# Patient Record
Sex: MALE | Race: OTHER | HISPANIC OR LATINO | URBAN - METROPOLITAN AREA
[De-identification: names, ages, dates, MRNs, and addresses within clinical notes are randomized per-mention and may not be internally consistent; named-entity substitution may affect disease eponyms.]

---

## 2018-07-07 ENCOUNTER — EMERGENCY (EMERGENCY)
Facility: HOSPITAL | Age: 68
LOS: 1 days | Discharge: HOME | End: 2018-07-07
Attending: EMERGENCY MEDICINE
Payer: COMMERCIAL

## 2018-07-07 VITALS
TEMPERATURE: 98 F | DIASTOLIC BLOOD PRESSURE: 85 MMHG | HEART RATE: 65 BPM | RESPIRATION RATE: 18 BRPM | SYSTOLIC BLOOD PRESSURE: 136 MMHG | OXYGEN SATURATION: 97 %

## 2018-07-07 VITALS — WEIGHT: 160.06 LBS | HEIGHT: 65 IN

## 2018-07-07 LAB
ALBUMIN SERPL ELPH-MCNC: 4.3 G/DL — SIGNIFICANT CHANGE UP (ref 3.3–5.2)
ALP SERPL-CCNC: 107 U/L — SIGNIFICANT CHANGE UP (ref 40–120)
ALT FLD-CCNC: 26 U/L — SIGNIFICANT CHANGE UP
ANION GAP SERPL CALC-SCNC: 14 MMOL/L — SIGNIFICANT CHANGE UP (ref 5–17)
APPEARANCE UR: CLEAR — SIGNIFICANT CHANGE UP
AST SERPL-CCNC: 24 U/L — SIGNIFICANT CHANGE UP
BACTERIA # UR AUTO: ABNORMAL
BASOPHILS # BLD AUTO: 0 K/UL — SIGNIFICANT CHANGE UP (ref 0–0.2)
BASOPHILS NFR BLD AUTO: 0.1 % — SIGNIFICANT CHANGE UP (ref 0–2)
BILIRUB SERPL-MCNC: 0.2 MG/DL — LOW (ref 0.4–2)
BILIRUB UR-MCNC: NEGATIVE — SIGNIFICANT CHANGE UP
BUN SERPL-MCNC: 10 MG/DL — SIGNIFICANT CHANGE UP (ref 8–20)
CALCIUM SERPL-MCNC: 9 MG/DL — SIGNIFICANT CHANGE UP (ref 8.6–10.2)
CHLORIDE SERPL-SCNC: 103 MMOL/L — SIGNIFICANT CHANGE UP (ref 98–107)
CO2 SERPL-SCNC: 21 MMOL/L — LOW (ref 22–29)
COLOR SPEC: YELLOW — SIGNIFICANT CHANGE UP
CREAT SERPL-MCNC: 0.64 MG/DL — SIGNIFICANT CHANGE UP (ref 0.5–1.3)
DIFF PNL FLD: NEGATIVE — SIGNIFICANT CHANGE UP
EOSINOPHIL # BLD AUTO: 0.2 K/UL — SIGNIFICANT CHANGE UP (ref 0–0.5)
EOSINOPHIL NFR BLD AUTO: 2.6 % — SIGNIFICANT CHANGE UP (ref 0–5)
EPI CELLS # UR: SIGNIFICANT CHANGE UP
GLUCOSE SERPL-MCNC: 110 MG/DL — SIGNIFICANT CHANGE UP (ref 70–115)
GLUCOSE UR QL: NEGATIVE MG/DL — SIGNIFICANT CHANGE UP
HCT VFR BLD CALC: 45.1 % — SIGNIFICANT CHANGE UP (ref 42–52)
HGB BLD-MCNC: 15.1 G/DL — SIGNIFICANT CHANGE UP (ref 14–18)
KETONES UR-MCNC: NEGATIVE — SIGNIFICANT CHANGE UP
LEUKOCYTE ESTERASE UR-ACNC: NEGATIVE — SIGNIFICANT CHANGE UP
LIDOCAIN IGE QN: 30 U/L — SIGNIFICANT CHANGE UP (ref 22–51)
LYMPHOCYTES # BLD AUTO: 0.9 K/UL — LOW (ref 1–4.8)
LYMPHOCYTES # BLD AUTO: 10.9 % — LOW (ref 20–55)
MCHC RBC-ENTMCNC: 25.2 PG — LOW (ref 27–31)
MCHC RBC-ENTMCNC: 33.5 G/DL — SIGNIFICANT CHANGE UP (ref 32–36)
MCV RBC AUTO: 75.2 FL — LOW (ref 80–94)
MONOCYTES # BLD AUTO: 0.6 K/UL — SIGNIFICANT CHANGE UP (ref 0–0.8)
MONOCYTES NFR BLD AUTO: 6.9 % — SIGNIFICANT CHANGE UP (ref 3–10)
NEUTROPHILS # BLD AUTO: 6.7 K/UL — SIGNIFICANT CHANGE UP (ref 1.8–8)
NEUTROPHILS NFR BLD AUTO: 79.3 % — HIGH (ref 37–73)
NITRITE UR-MCNC: NEGATIVE — SIGNIFICANT CHANGE UP
PH UR: 7 — SIGNIFICANT CHANGE UP (ref 5–8)
PLATELET # BLD AUTO: 131 K/UL — LOW (ref 150–400)
POTASSIUM SERPL-MCNC: 4.3 MMOL/L — SIGNIFICANT CHANGE UP (ref 3.5–5.3)
POTASSIUM SERPL-SCNC: 4.3 MMOL/L — SIGNIFICANT CHANGE UP (ref 3.5–5.3)
PROT SERPL-MCNC: 7.2 G/DL — SIGNIFICANT CHANGE UP (ref 6.6–8.7)
PROT UR-MCNC: NEGATIVE MG/DL — SIGNIFICANT CHANGE UP
RBC # BLD: 6 M/UL — SIGNIFICANT CHANGE UP (ref 4.6–6.2)
RBC # FLD: 13.9 % — SIGNIFICANT CHANGE UP (ref 11–15.6)
RBC CASTS # UR COMP ASSIST: SIGNIFICANT CHANGE UP /HPF (ref 0–4)
SODIUM SERPL-SCNC: 138 MMOL/L — SIGNIFICANT CHANGE UP (ref 135–145)
SP GR SPEC: 1 — LOW (ref 1.01–1.02)
UROBILINOGEN FLD QL: NEGATIVE MG/DL — SIGNIFICANT CHANGE UP
WBC # BLD: 8.5 K/UL — SIGNIFICANT CHANGE UP (ref 4.8–10.8)
WBC # FLD AUTO: 8.5 K/UL — SIGNIFICANT CHANGE UP (ref 4.8–10.8)
WBC UR QL: SIGNIFICANT CHANGE UP

## 2018-07-07 PROCEDURE — 85027 COMPLETE CBC AUTOMATED: CPT

## 2018-07-07 PROCEDURE — 80053 COMPREHEN METABOLIC PANEL: CPT

## 2018-07-07 PROCEDURE — 74177 CT ABD & PELVIS W/CONTRAST: CPT

## 2018-07-07 PROCEDURE — 99284 EMERGENCY DEPT VISIT MOD MDM: CPT

## 2018-07-07 PROCEDURE — 36415 COLL VENOUS BLD VENIPUNCTURE: CPT

## 2018-07-07 PROCEDURE — 83690 ASSAY OF LIPASE: CPT

## 2018-07-07 PROCEDURE — 81001 URINALYSIS AUTO W/SCOPE: CPT

## 2018-07-07 PROCEDURE — T1013: CPT

## 2018-07-07 PROCEDURE — 74177 CT ABD & PELVIS W/CONTRAST: CPT | Mod: 26

## 2018-07-07 RX ORDER — SODIUM CHLORIDE 9 MG/ML
3 INJECTION INTRAMUSCULAR; INTRAVENOUS; SUBCUTANEOUS ONCE
Qty: 0 | Refills: 0 | Status: COMPLETED | OUTPATIENT
Start: 2018-07-07 | End: 2018-07-07

## 2018-07-07 RX ADMIN — SODIUM CHLORIDE 3 MILLILITER(S): 9 INJECTION INTRAMUSCULAR; INTRAVENOUS; SUBCUTANEOUS at 19:48

## 2018-07-07 NOTE — ED PROVIDER NOTE - CHPI ED SYMPTOMS NEG
no abdominal distension/no hematuria/no blood in stool/no nausea/no diarrhea/no chills/no fever/no dysuria/no vomiting/no burning urination

## 2018-07-07 NOTE — ED PROVIDER NOTE - OBJECTIVE STATEMENT
67 yo c/o c/o right lower quadrant pain radiating to the right flank for the last 8 days no urinary tract symptoms no burning no frequency urgency dysuria no constipation no diarrhea

## 2019-01-10 ENCOUNTER — RESULT REVIEW (OUTPATIENT)
Age: 69
End: 2019-01-10

## 2019-01-11 ENCOUNTER — APPOINTMENT (OUTPATIENT)
Dept: DERMATOLOGY | Facility: CLINIC | Age: 69
End: 2019-01-11
Payer: MEDICARE

## 2019-01-11 PROCEDURE — 11104 PUNCH BX SKIN SINGLE LESION: CPT

## 2019-01-11 PROCEDURE — 99204 OFFICE O/P NEW MOD 45 MIN: CPT | Mod: 25

## 2019-01-23 ENCOUNTER — APPOINTMENT (OUTPATIENT)
Dept: DERMATOLOGY | Facility: CLINIC | Age: 69
End: 2019-01-23
Payer: MEDICARE

## 2019-01-23 PROCEDURE — 99213 OFFICE O/P EST LOW 20 MIN: CPT

## 2019-08-13 ENCOUNTER — APPOINTMENT (OUTPATIENT)
Dept: PULMONOLOGY | Facility: CLINIC | Age: 69
End: 2019-08-13

## 2020-02-03 ENCOUNTER — EMERGENCY (EMERGENCY)
Facility: HOSPITAL | Age: 70
LOS: 1 days | Discharge: HOME | End: 2020-02-03
Attending: EMERGENCY MEDICINE
Payer: COMMERCIAL

## 2020-02-03 VITALS
RESPIRATION RATE: 18 BRPM | TEMPERATURE: 98 F | DIASTOLIC BLOOD PRESSURE: 77 MMHG | WEIGHT: 160.06 LBS | HEIGHT: 65 IN | OXYGEN SATURATION: 98 % | HEART RATE: 73 BPM | SYSTOLIC BLOOD PRESSURE: 128 MMHG

## 2020-02-03 PROCEDURE — 99284 EMERGENCY DEPT VISIT MOD MDM: CPT

## 2020-02-03 PROCEDURE — 73610 X-RAY EXAM OF ANKLE: CPT

## 2020-02-03 PROCEDURE — T1013: CPT

## 2020-02-03 PROCEDURE — 73610 X-RAY EXAM OF ANKLE: CPT | Mod: 26,LT

## 2020-02-03 PROCEDURE — 73590 X-RAY EXAM OF LOWER LEG: CPT

## 2020-02-03 PROCEDURE — 73590 X-RAY EXAM OF LOWER LEG: CPT | Mod: 26,LT

## 2020-02-03 RX ORDER — IBUPROFEN 200 MG
600 TABLET ORAL ONCE
Refills: 0 | Status: COMPLETED | OUTPATIENT
Start: 2020-02-03 | End: 2020-02-03

## 2020-02-03 RX ADMIN — Medication 600 MILLIGRAM(S): at 15:43

## 2020-02-03 NOTE — ED PROVIDER NOTE - PATIENT PORTAL LINK FT
You can access the FollowMyHealth Patient Portal offered by [facility] by registering at the following website: [email or web address]. By joining [employer] [facility]’s FollowMyHealth portal, you will also be able to view your health information using other applications (apps) compatible with our system.

## 2020-02-03 NOTE — ED PROVIDER NOTE - PROGRESS NOTE DETAILS
Acute Ed read of Xray shows no acute fractures/ dislocation. PT aware if secondary reading of imaging changes they will be notified. Pt educated on possible signs of worsening condition.

## 2020-02-03 NOTE — ED PROVIDER NOTE - NSFOLLOWUPINSTRUCTIONS_ED_ALL_ED_FT
1. TAKE ALL MEDICATIONS AS DIRECTED.  FOR PAIN YOU CAN TAKE IBUPROFEN (MOTRIN, ADVIL) OR ACETAMINOPHEN (TYLENOL) AS NEEDED, AS DIRECTED ON PACKAGING.  2. FOLLOW UP WITH __Dr. Lyman________ AS DIRECTED.  YOU WERE GIVEN COPIES OF ALL LABS AND IMAGING RESULTS FROM YOUR ER VISIT--PLEASE TAKE THEM WITH YOU TO YOUR APPOINTMENT.  3. IF NEEDED, CALL [phone] TO FIND A PRIMARY CARE PHYSICIAN.  OR CALL [phone] TO MAKE AN APPOINTMENT WITH THE MEDICAL CLINIC.  4. RETURN TO THE ER FOR ANY WORSENING SYMPTOMS.   5. Return immediately to ED if you have numbness, tingling, pain out of proportion.

## 2020-02-03 NOTE — ED ADULT TRIAGE NOTE - CHIEF COMPLAINT QUOTE
A piece of metal fell on back and is now having back pain and pain on left lower extremity posteriorly.  Abrasion and hematoma noted on left lower extremity.  Did not hit head, denies LOC. Ambulating with steady gait.

## 2020-02-03 NOTE — ED PROVIDER NOTE - OBJECTIVE STATEMENT
71 yo M Pt, presents to ED complaining of L leg pain x 3 hrs. Pt states he has L posterior leg pain and R upper back pain after a large piece of metal fell on his back and leg. Pt states he was at work when incident happened. Pt admits to associated bruising and abrasion to L leg. Pt denies LOC, head trauma, numbness, weakness, N/V. abdominal pain, and any other acute symptoms at this time. denies blood thinner use.

## 2020-02-03 NOTE — ED PROVIDER NOTE - ATTENDING CONTRIBUTION TO CARE
69 yo M Pt, presents to ED complaining of L leg pain sp a large piece of metal fell on his back and leg. able to ambulate. Denies f/c/n/v/cp/sob/palpitations/ cough/rash/headache/dizziness/abd.pain/d/c/dysuria/hematuria, NO fecal or urinary incontinence.     Head: atraumatic, normacephalic  Face: atraumatic, no crepitus no orbiral/maxillary/mandibular ttp  throat: uvula midline no exudates  eyes: perrla eomi  heart: rrr s1s2  lungs: ctab  abd: soft, nt nd +bs no rebound/guarding no cva ttp  skin: warm  LE: LEFT - abrasion echymosis to the back of left leg; achiles tendon intact FROM to hip/knee/ankel foot no ttp to bone; neurovascularly intact  back: no midline cervical/thoracic/lumbar ttp      --msk pain/abrasion /contusion tetnjus uptodate--analgesia--xray reassess

## 2020-02-03 NOTE — ED ADULT NURSE NOTE - CHPI ED NUR SYMPTOMS NEG
no tingling/no neck tenderness/no anorexia/no fatigue/no bowel dysfunction/no constipation/no motor function loss/no numbness/no bladder dysfunction/no difficulty bearing weight

## 2020-02-03 NOTE — ED ADULT NURSE NOTE - OBJECTIVE STATEMENT
70 year old male, presents to the ED with back pain and pain on left lower extremity posteriorly.  Abrasion and hematoma noted on left lower extremity.  Did not hit head, denies LOC. Ambulating with steady gait.  Patient A/Ox4, respirations are even and non labored, lungs clear bilaterally, abdomen is soft and non tender, Full ROM in all extremities. NAD noted. n.

## 2020-02-03 NOTE — ED PROVIDER NOTE - CARE PROVIDER_API CALL
Alaina Lyman)  Orthopedics  [address] [place] [address], [address]  [place] [address]  Phone: [phone]  Fax: [phone]  Follow Up Time:

## 2020-02-03 NOTE — ED PROVIDER NOTE - MUSCULOSKELETAL, MLM
Spine appears normal,  no midline vertebral tenderness, range of motion is not limited, back is non TTP through out, + TTP of LLE posterior calf, with associated ecchymosis and superficial abrasion, able to weight bear, no ligamentous laxity

## 2020-08-05 ENCOUNTER — APPOINTMENT (OUTPATIENT)
Dept: DERMATOLOGY | Facility: CLINIC | Age: 70
End: 2020-08-05
Payer: COMMERCIAL

## 2020-08-05 PROCEDURE — 99214 OFFICE O/P EST MOD 30 MIN: CPT

## 2020-10-14 NOTE — ED PROVIDER NOTE - NS ED MD DISPO DISCHARGE
[Mucoid Discharge] : mucoid discharge [Transmitted Upper Airway Sounds] : transmitted upper airway sounds [NL] : nontender cervical lymph nodes, supple, full passive range of motion [de-identified] : +thick PND [FreeTextEntry9] : soft, non tender, not distended, no hepatosplenomegaly, no rebound tenderness Home

## 2021-07-27 NOTE — ED ADULT NURSE NOTE - BREATHING, MLM
Addended by: RADHA CHAUDHRY on: 7/27/2021 03:32 PM     Modules accepted: Orders    
Spontaneous, unlabored and symmetrical

## 2021-09-02 NOTE — ED ADULT NURSE NOTE - BRADEN SCORE (IF 18 OR LESS ACTIVATE SKIN INJURY RISK INCREASED GUIDELINE), MLM
23 Double O-Z Plasty Text: The defect edges were debeveled with a #15 scalpel blade.  Given the location of the defect, shape of the defect and the proximity to free margins a Double O-Z plasty (double transposition flap) was deemed most appropriate.  Using a sterile surgical marker, the appropriate transposition flaps were drawn incorporating the defect and placing the expected incisions within the relaxed skin tension lines where possible. The area thus outlined was incised deep to adipose tissue with a #15 scalpel blade.  The skin margins were undermined to an appropriate distance in all directions utilizing iris scissors.  Hemostasis was achieved with electrocautery.  The flaps were then transposed into place, one clockwise and the other counterclockwise, and anchored with interrupted buried subcutaneous sutures.

## 2024-01-10 ENCOUNTER — INPATIENT (INPATIENT)
Facility: HOSPITAL | Age: 74
LOS: 1 days | Discharge: HOME | DRG: 812 | End: 2024-01-12
Attending: STUDENT IN AN ORGANIZED HEALTH CARE EDUCATION/TRAINING PROGRAM | Admitting: STUDENT IN AN ORGANIZED HEALTH CARE EDUCATION/TRAINING PROGRAM
Payer: MEDICARE

## 2024-01-10 VITALS
HEIGHT: 61 IN | OXYGEN SATURATION: 98 % | DIASTOLIC BLOOD PRESSURE: 80 MMHG | SYSTOLIC BLOOD PRESSURE: 145 MMHG | RESPIRATION RATE: 18 BRPM | HEART RATE: 91 BPM | WEIGHT: 169.98 LBS | TEMPERATURE: 99 F

## 2024-01-10 DIAGNOSIS — D61.818 OTHER PANCYTOPENIA: ICD-10-CM

## 2024-01-10 LAB
ALBUMIN SERPL ELPH-MCNC: 4.4 G/DL — SIGNIFICANT CHANGE UP (ref 3.3–5.2)
ALBUMIN SERPL ELPH-MCNC: 4.4 G/DL — SIGNIFICANT CHANGE UP (ref 3.3–5.2)
ALP SERPL-CCNC: 116 U/L — SIGNIFICANT CHANGE UP (ref 40–120)
ALP SERPL-CCNC: 116 U/L — SIGNIFICANT CHANGE UP (ref 40–120)
ALT FLD-CCNC: 13 U/L — SIGNIFICANT CHANGE UP
ALT FLD-CCNC: 13 U/L — SIGNIFICANT CHANGE UP
ANION GAP SERPL CALC-SCNC: 11 MMOL/L — SIGNIFICANT CHANGE UP (ref 5–17)
ANION GAP SERPL CALC-SCNC: 11 MMOL/L — SIGNIFICANT CHANGE UP (ref 5–17)
ANISOCYTOSIS BLD QL: SIGNIFICANT CHANGE UP
ANISOCYTOSIS BLD QL: SIGNIFICANT CHANGE UP
APTT BLD: 32.6 SEC — SIGNIFICANT CHANGE UP (ref 24.5–35.6)
APTT BLD: 32.6 SEC — SIGNIFICANT CHANGE UP (ref 24.5–35.6)
AST SERPL-CCNC: 17 U/L — SIGNIFICANT CHANGE UP
AST SERPL-CCNC: 17 U/L — SIGNIFICANT CHANGE UP
BASOPHILS # BLD AUTO: 0.01 K/UL — SIGNIFICANT CHANGE UP (ref 0–0.2)
BASOPHILS # BLD AUTO: 0.01 K/UL — SIGNIFICANT CHANGE UP (ref 0–0.2)
BASOPHILS NFR BLD AUTO: 0.9 % — SIGNIFICANT CHANGE UP (ref 0–2)
BASOPHILS NFR BLD AUTO: 0.9 % — SIGNIFICANT CHANGE UP (ref 0–2)
BILIRUB SERPL-MCNC: 0.5 MG/DL — SIGNIFICANT CHANGE UP (ref 0.4–2)
BILIRUB SERPL-MCNC: 0.5 MG/DL — SIGNIFICANT CHANGE UP (ref 0.4–2)
BLASTS # FLD: 3.5 % — HIGH (ref 0–0)
BLASTS # FLD: 3.5 % — HIGH (ref 0–0)
BLD GP AB SCN SERPL QL: SIGNIFICANT CHANGE UP
BLD GP AB SCN SERPL QL: SIGNIFICANT CHANGE UP
BUN SERPL-MCNC: 11.5 MG/DL — SIGNIFICANT CHANGE UP (ref 8–20)
BUN SERPL-MCNC: 11.5 MG/DL — SIGNIFICANT CHANGE UP (ref 8–20)
CALCIUM SERPL-MCNC: 8.7 MG/DL — SIGNIFICANT CHANGE UP (ref 8.4–10.5)
CALCIUM SERPL-MCNC: 8.7 MG/DL — SIGNIFICANT CHANGE UP (ref 8.4–10.5)
CHLORIDE SERPL-SCNC: 104 MMOL/L — SIGNIFICANT CHANGE UP (ref 96–108)
CHLORIDE SERPL-SCNC: 104 MMOL/L — SIGNIFICANT CHANGE UP (ref 96–108)
CO2 SERPL-SCNC: 24 MMOL/L — SIGNIFICANT CHANGE UP (ref 22–29)
CO2 SERPL-SCNC: 24 MMOL/L — SIGNIFICANT CHANGE UP (ref 22–29)
CREAT SERPL-MCNC: 0.71 MG/DL — SIGNIFICANT CHANGE UP (ref 0.5–1.3)
CREAT SERPL-MCNC: 0.71 MG/DL — SIGNIFICANT CHANGE UP (ref 0.5–1.3)
D DIMER BLD IA.RAPID-MCNC: <150 NG/ML DDU — SIGNIFICANT CHANGE UP
D DIMER BLD IA.RAPID-MCNC: <150 NG/ML DDU — SIGNIFICANT CHANGE UP
DACRYOCYTES BLD QL SMEAR: SLIGHT — SIGNIFICANT CHANGE UP
DACRYOCYTES BLD QL SMEAR: SLIGHT — SIGNIFICANT CHANGE UP
EGFR: 97 ML/MIN/1.73M2 — SIGNIFICANT CHANGE UP
EGFR: 97 ML/MIN/1.73M2 — SIGNIFICANT CHANGE UP
ELLIPTOCYTES BLD QL SMEAR: SIGNIFICANT CHANGE UP
ELLIPTOCYTES BLD QL SMEAR: SIGNIFICANT CHANGE UP
EOSINOPHIL # BLD AUTO: 0.01 K/UL — SIGNIFICANT CHANGE UP (ref 0–0.5)
EOSINOPHIL # BLD AUTO: 0.01 K/UL — SIGNIFICANT CHANGE UP (ref 0–0.5)
EOSINOPHIL NFR BLD AUTO: 0.9 % — SIGNIFICANT CHANGE UP (ref 0–6)
EOSINOPHIL NFR BLD AUTO: 0.9 % — SIGNIFICANT CHANGE UP (ref 0–6)
FERRITIN SERPL-MCNC: 288 NG/ML — SIGNIFICANT CHANGE UP (ref 30–400)
FERRITIN SERPL-MCNC: 288 NG/ML — SIGNIFICANT CHANGE UP (ref 30–400)
FOLATE SERPL-MCNC: >20 NG/ML — SIGNIFICANT CHANGE UP
FOLATE SERPL-MCNC: >20 NG/ML — SIGNIFICANT CHANGE UP
GIANT PLATELETS BLD QL SMEAR: PRESENT — SIGNIFICANT CHANGE UP
GIANT PLATELETS BLD QL SMEAR: PRESENT — SIGNIFICANT CHANGE UP
GLUCOSE SERPL-MCNC: 118 MG/DL — HIGH (ref 70–99)
GLUCOSE SERPL-MCNC: 118 MG/DL — HIGH (ref 70–99)
HCT VFR BLD CALC: 27.4 % — LOW (ref 39–50)
HCT VFR BLD CALC: 27.4 % — LOW (ref 39–50)
HGB BLD-MCNC: 8.4 G/DL — LOW (ref 13–17)
HGB BLD-MCNC: 8.4 G/DL — LOW (ref 13–17)
HIV 1 & 2 AB SERPL IA.RAPID: SIGNIFICANT CHANGE UP
HIV 1 & 2 AB SERPL IA.RAPID: SIGNIFICANT CHANGE UP
INR BLD: 1.25 RATIO — HIGH (ref 0.85–1.18)
INR BLD: 1.25 RATIO — HIGH (ref 0.85–1.18)
IRON SATN MFR SERPL: 25 % — SIGNIFICANT CHANGE UP (ref 16–55)
IRON SATN MFR SERPL: 25 % — SIGNIFICANT CHANGE UP (ref 16–55)
IRON SATN MFR SERPL: 61 UG/DL — SIGNIFICANT CHANGE UP (ref 59–158)
IRON SATN MFR SERPL: 61 UG/DL — SIGNIFICANT CHANGE UP (ref 59–158)
LACTATE BLDV-MCNC: 1.3 MMOL/L — SIGNIFICANT CHANGE UP (ref 0.5–2)
LACTATE BLDV-MCNC: 1.3 MMOL/L — SIGNIFICANT CHANGE UP (ref 0.5–2)
LDH SERPL L TO P-CCNC: 204 U/L — HIGH (ref 98–192)
LDH SERPL L TO P-CCNC: 204 U/L — HIGH (ref 98–192)
LDH SERPL L TO P-CCNC: 206 U/L — HIGH (ref 98–192)
LDH SERPL L TO P-CCNC: 206 U/L — HIGH (ref 98–192)
LYMPHOCYTES # BLD AUTO: 0.14 K/UL — LOW (ref 1–3.3)
LYMPHOCYTES # BLD AUTO: 0.14 K/UL — LOW (ref 1–3.3)
LYMPHOCYTES # BLD AUTO: 21.2 % — SIGNIFICANT CHANGE UP (ref 13–44)
LYMPHOCYTES # BLD AUTO: 21.2 % — SIGNIFICANT CHANGE UP (ref 13–44)
MAGNESIUM SERPL-MCNC: 2.3 MG/DL — SIGNIFICANT CHANGE UP (ref 1.6–2.6)
MAGNESIUM SERPL-MCNC: 2.3 MG/DL — SIGNIFICANT CHANGE UP (ref 1.6–2.6)
MANUAL SMEAR VERIFICATION: SIGNIFICANT CHANGE UP
MANUAL SMEAR VERIFICATION: SIGNIFICANT CHANGE UP
MCHC RBC-ENTMCNC: 23.6 PG — LOW (ref 27–34)
MCHC RBC-ENTMCNC: 23.6 PG — LOW (ref 27–34)
MCHC RBC-ENTMCNC: 30.7 GM/DL — LOW (ref 32–36)
MCHC RBC-ENTMCNC: 30.7 GM/DL — LOW (ref 32–36)
MCV RBC AUTO: 77 FL — LOW (ref 80–100)
MCV RBC AUTO: 77 FL — LOW (ref 80–100)
MICROCYTES BLD QL: SIGNIFICANT CHANGE UP
MICROCYTES BLD QL: SIGNIFICANT CHANGE UP
MONOCYTES # BLD AUTO: 0.01 K/UL — SIGNIFICANT CHANGE UP (ref 0–0.9)
MONOCYTES # BLD AUTO: 0.01 K/UL — SIGNIFICANT CHANGE UP (ref 0–0.9)
MONOCYTES NFR BLD AUTO: 1.8 % — LOW (ref 2–14)
MONOCYTES NFR BLD AUTO: 1.8 % — LOW (ref 2–14)
NEUTROPHILS # BLD AUTO: 0.46 K/UL — LOW (ref 1.8–7.4)
NEUTROPHILS # BLD AUTO: 0.46 K/UL — LOW (ref 1.8–7.4)
NEUTROPHILS NFR BLD AUTO: 67.3 % — SIGNIFICANT CHANGE UP (ref 43–77)
NEUTROPHILS NFR BLD AUTO: 67.3 % — SIGNIFICANT CHANGE UP (ref 43–77)
NRBC # BLD: 2 /100 WBCS — HIGH (ref 0–0)
NRBC # BLD: 2 /100 WBCS — HIGH (ref 0–0)
OVALOCYTES BLD QL SMEAR: SIGNIFICANT CHANGE UP
OVALOCYTES BLD QL SMEAR: SIGNIFICANT CHANGE UP
PLAT MORPH BLD: NORMAL — SIGNIFICANT CHANGE UP
PLAT MORPH BLD: NORMAL — SIGNIFICANT CHANGE UP
PLATELET # BLD AUTO: 30 K/UL — LOW (ref 150–400)
PLATELET # BLD AUTO: 30 K/UL — LOW (ref 150–400)
POIKILOCYTOSIS BLD QL AUTO: SIGNIFICANT CHANGE UP
POIKILOCYTOSIS BLD QL AUTO: SIGNIFICANT CHANGE UP
POLYCHROMASIA BLD QL SMEAR: SLIGHT — SIGNIFICANT CHANGE UP
POLYCHROMASIA BLD QL SMEAR: SLIGHT — SIGNIFICANT CHANGE UP
POTASSIUM SERPL-MCNC: 4 MMOL/L — SIGNIFICANT CHANGE UP (ref 3.5–5.3)
POTASSIUM SERPL-MCNC: 4 MMOL/L — SIGNIFICANT CHANGE UP (ref 3.5–5.3)
POTASSIUM SERPL-SCNC: 4 MMOL/L — SIGNIFICANT CHANGE UP (ref 3.5–5.3)
POTASSIUM SERPL-SCNC: 4 MMOL/L — SIGNIFICANT CHANGE UP (ref 3.5–5.3)
PROT SERPL-MCNC: 7.2 G/DL — SIGNIFICANT CHANGE UP (ref 6.6–8.7)
PROT SERPL-MCNC: 7.2 G/DL — SIGNIFICANT CHANGE UP (ref 6.6–8.7)
PROTHROM AB SERPL-ACNC: 13.8 SEC — HIGH (ref 9.5–13)
PROTHROM AB SERPL-ACNC: 13.8 SEC — HIGH (ref 9.5–13)
RBC # BLD: 3.56 M/UL — LOW (ref 4.2–5.8)
RBC # BLD: 3.56 M/UL — LOW (ref 4.2–5.8)
RBC # FLD: 25.2 % — HIGH (ref 10.3–14.5)
RBC # FLD: 25.2 % — HIGH (ref 10.3–14.5)
RBC BLD AUTO: ABNORMAL
RBC BLD AUTO: ABNORMAL
SCHISTOCYTES BLD QL AUTO: SLIGHT — SIGNIFICANT CHANGE UP
SCHISTOCYTES BLD QL AUTO: SLIGHT — SIGNIFICANT CHANGE UP
SODIUM SERPL-SCNC: 139 MMOL/L — SIGNIFICANT CHANGE UP (ref 135–145)
SODIUM SERPL-SCNC: 139 MMOL/L — SIGNIFICANT CHANGE UP (ref 135–145)
TIBC SERPL-MCNC: 240 UG/DL — SIGNIFICANT CHANGE UP (ref 220–430)
TIBC SERPL-MCNC: 240 UG/DL — SIGNIFICANT CHANGE UP (ref 220–430)
TRANSFERRIN SERPL-MCNC: 168 MG/DL — LOW (ref 180–329)
TRANSFERRIN SERPL-MCNC: 168 MG/DL — LOW (ref 180–329)
TROPONIN T, HIGH SENSITIVITY RESULT: 8 NG/L — SIGNIFICANT CHANGE UP (ref 0–51)
TROPONIN T, HIGH SENSITIVITY RESULT: 8 NG/L — SIGNIFICANT CHANGE UP (ref 0–51)
TROPONIN T, HIGH SENSITIVITY RESULT: 9 NG/L — SIGNIFICANT CHANGE UP (ref 0–51)
TROPONIN T, HIGH SENSITIVITY RESULT: 9 NG/L — SIGNIFICANT CHANGE UP (ref 0–51)
TSH SERPL-MCNC: 1.49 UIU/ML — SIGNIFICANT CHANGE UP (ref 0.27–4.2)
TSH SERPL-MCNC: 1.49 UIU/ML — SIGNIFICANT CHANGE UP (ref 0.27–4.2)
URATE SERPL-MCNC: 5 MG/DL — SIGNIFICANT CHANGE UP (ref 3.4–7)
URATE SERPL-MCNC: 5 MG/DL — SIGNIFICANT CHANGE UP (ref 3.4–7)
URATE SERPL-MCNC: 5.1 MG/DL — SIGNIFICANT CHANGE UP (ref 3.4–7)
URATE SERPL-MCNC: 5.1 MG/DL — SIGNIFICANT CHANGE UP (ref 3.4–7)
VARIANT LYMPHS # BLD: 4.4 % — SIGNIFICANT CHANGE UP (ref 0–6)
VARIANT LYMPHS # BLD: 4.4 % — SIGNIFICANT CHANGE UP (ref 0–6)
VIT B12 SERPL-MCNC: 423 PG/ML — SIGNIFICANT CHANGE UP (ref 232–1245)
VIT B12 SERPL-MCNC: 423 PG/ML — SIGNIFICANT CHANGE UP (ref 232–1245)
WBC # BLD: 0.68 K/UL — CRITICAL LOW (ref 3.8–10.5)
WBC # BLD: 0.68 K/UL — CRITICAL LOW (ref 3.8–10.5)
WBC # FLD AUTO: 0.68 K/UL — CRITICAL LOW (ref 3.8–10.5)
WBC # FLD AUTO: 0.68 K/UL — CRITICAL LOW (ref 3.8–10.5)

## 2024-01-10 PROCEDURE — 88189 FLOWCYTOMETRY/READ 16 & >: CPT

## 2024-01-10 PROCEDURE — 71045 X-RAY EXAM CHEST 1 VIEW: CPT | Mod: 26

## 2024-01-10 PROCEDURE — 99223 1ST HOSP IP/OBS HIGH 75: CPT

## 2024-01-10 PROCEDURE — 99285 EMERGENCY DEPT VISIT HI MDM: CPT

## 2024-01-10 NOTE — CONSULT NOTE ADULT - ASSESSMENT
Patient with a past medical history of reported thrombocytopenia (hasnt been f/u in 5+years) sent to ER by pcp for abnormal routine labs. 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 Khan and was told to come to ER for4 abnormal labs  -as per patient has hx of thrombocytopenia but hasn't been an issue besides can brusie easily at time, does not remember hematologist that he saw and stated hasnt followed w/them in 5+ years  -patient denies fever, chill, night sweats or weight loss but does admit that he has had a URI on and off for past month w/ runny nose  -denies any auto-immune disorders  -pending differential discussed w/ lab to expedite and have pathologist review smear to r/o blast)  -further recs dependent on findings above       Thank you for the referral. Will continue to monitor the patient.  Please call with any questions [phone]  Above reviewed with Attending Dr. Junior     [place] [address] [place] [address]  [phone]  *Note not finalized until signed by Attending Physician   Patient with a past medical history of reported thrombocytopenia (hasnt been f/u in 5+years) sent to ER by pcp for abnormal routine labs. 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 Khan and was told to come to ER for4 abnormal labs  -as per patient has hx of thrombocytopenia but hasn't been an issue besides can brusie easily at time, does not remember hematologist that he saw and stated hasnt followed w/them in 5+ years  -patient denies fever, chill, night sweats or weight loss but does admit that he has had a URI on and off for past month w/ runny nose  -denies any auto-immune disorders  -pending differential discussed w/ lab to expedite and have pathologist review smear to r/o blast)  -further recs dependent on findings above       Thank you for the referral. Will continue to monitor the patient.  Please call with any questions [phone]  Above reviewed with Attending Dr. Junior     [place] [address] [place] [address]  [phone]  *Note not finalized until signed by Attending Physician   Patient with a past medical history of reported thrombocytopenia (hasnt been f/u in 5+years) sent to ER by pcp for abnormal routine labs. 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 %  Nucleated RBC: 2 /100 WBCs    #Pancytopenia   -patient stated has was getting routine blood work at PCP office in [place] Dr. Khan and was told to come to ER for4 abnormal labs  -as per patient has hx of thrombocytopenia but hasn't been an issue besides can brusie easily at time, does not remember hematologist that he saw and stated hasnt followed w/them in 5+ years  -patient denies fever, chill, night sweats or weight loss but does admit that he has had a URI on and off for past month w/ runny nose  -denies any auto-immune disorders  -manual smear showing 3.5% blast concerning for acute leukemia   RECS:  -send uric acid LDH, HIV, hep panel, G6PD  -will send flow cyt  -rec transfer to [facility] ER to ER for further w/u and mgmt       Thank you for the referral. Will continue to monitor the patient.  Please call with any questions [phone]  Above reviewed with Attending Dr. Junior     [place] [address] [place] [address]  [phone]  *Note not finalized until signed by Attending Physician   Patient with a past medical history of reported thrombocytopenia (hasnt been f/u in 5+years) sent to ER by pcp for abnormal routine labs. 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 %  Nucleated RBC: 2 /100 WBCs    #Pancytopenia   -patient stated has was getting routine blood work at PCP office in [place] Dr. Khan and was told to come to ER for4 abnormal labs  -as per patient has hx of thrombocytopenia but hasn't been an issue besides can brusie easily at time, does not remember hematologist that he saw and stated hasnt followed w/them in 5+ years  -patient denies fever, chill, night sweats or weight loss but does admit that he has had a URI on and off for past month w/ runny nose  -denies any auto-immune disorders  -manual smear showing 3.5% blast concerning for acute leukemia   RECS:  -send uric acid LDH, HIV, hep panel, G6PD  -will send flow cyt  -rec transfer to [facility] ER to ER for further w/u and mgmt       Thank you for the referral. Will continue to monitor the patient.  Please call with any questions [phone]  Above reviewed with Attending Dr. Junior     [place] [address] [place] [address]  [phone]  *Note not finalized until signed by Attending Physician   Patient with a past medical history of reported thrombocytopenia (hasnt been f/u in 5+years) sent to ER by pcp for abnormal routine labs. 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 %  Nucleated RBC: 2 /100 WBCs    #Pancytopenia   -patient stated has was getting routine blood work at PCP office in [place] Dr. Khan and was told to come to ER for4 abnormal labs  -as per patient has hx of thrombocytopenia but hasn't been an issue besides can brusie easily at time, does not remember hematologist that he saw and stated hasnt followed w/them in 5+ years  -patient denies fever, chill, night sweats or weight loss but does admit that he has had a URI on and off for past month w/ runny nose  -denies any auto-immune disorders  -manual smear showing 3.5% blast concerning for acute leukemia   RECS:  -send uric acid LDH, HIV, hep panel, G6PD  -will send flow cyt  -rec transfer to [facility] ER to ER for further w/u and mgmt       -discussed w/ Dr. Carlisle and Dr. Junior     Thank you for the referral. Will continue to monitor the patient.  Please call with any questions [phone]  Above reviewed with Attending Dr. Junior     [facility] [place] [address] [place] [address]  [phone]  *Note not finalized until signed by Attending Physician   Patient with a past medical history of reported thrombocytopenia (hasnt been f/u in 5+years) sent to ER by pcp for abnormal routine labs. 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 %  Nucleated RBC: 2 /100 WBCs    #Pancytopenia   -patient stated has was getting routine blood work at PCP office in [place] Dr. Khan and was told to come to ER for4 abnormal labs  -as per patient has hx of thrombocytopenia but hasn't been an issue besides can brusie easily at time, does not remember hematologist that he saw and stated hasnt followed w/them in 5+ years  -patient denies fever, chill, night sweats or weight loss but does admit that he has had a URI on and off for past month w/ runny nose  -denies any auto-immune disorders  -manual smear showing 3.5% blast concerning for acute leukemia   RECS:  -send uric acid LDH, HIV, hep panel, G6PD  -will send flow cyt  -rec transfer to [facility] ER to ER for further w/u and mgmt       -discussed w/ Dr. Carlisle and Dr. Junior     Thank you for the referral. Will continue to monitor the patient.  Please call with any questions [phone]  Above reviewed with Attending Dr. Junior     [facility] [place] [address] [place] [address]  [phone]  *Note not finalized until signed by Attending Physician   Patient with a past medical history of reported thrombocytopenia (hasnt been f/u in 5+years) sent to ER by pcp for abnormal routine labs. 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 %  Nucleated RBC: 2 /100 WBCs    #Pancytopenia   -patient stated has was getting routine blood work at PCP office in [place] Dr. Khan and was told to come to ER for4 abnormal labs  -as per patient has hx of thrombocytopenia but hasn't been an issue besides can brusie easily at time, does not remember hematologist that he saw and stated hasnt followed w/them in 5+ years  -patient denies fever, chill, night sweats or weight loss but does admit that he has had a URI on and off for past month w/ runny nose  -denies any auto-immune disorders  -manual smear showing 3.5% blast concerning for acute leukemia   RECS:  -send uric acid LDH, HIV, hep panel, G6PD  -will send flow cyt  -rec transfer to [facility] ER to ER for further w/u and mgmt       -discussed w/ Dr. Carlisle and Dr. Junior     Thank you for the referral. Will continue to monitor the patient.  Please call with any questions [phone]  Above reviewed with Attending Dr. Junior     [facility]  [address] [place] [address]  [phone]  *Note not finalized until signed by Attending Physician   Patient with a past medical history of reported thrombocytopenia (hasnt been f/u in 5+years) sent to ER by pcp for abnormal routine labs. 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 %  Nucleated RBC: 2 /100 WBCs    #Pancytopenia   -patient stated has was getting routine blood work at PCP office in [place] Dr. Kahn and was told to come to ER for4 abnormal labs  -as per patient has hx of thrombocytopenia but hasn't been an issue besides can brusie easily at time, does not remember hematologist that he saw and stated hasnt followed w/them in 5+ years  -patient denies fever, chill, night sweats or weight loss but does admit that he has had a URI on and off for past month w/ runny nose  -denies any auto-immune disorders  -manual smear showing 3.5% blast concerning for acute leukemia   RECS:  -send uric acid LDH, HIV, hep panel, G6PD  -will send flow cyt  -rec transfer to [facility] ER to ER for further w/u and mgmt       -discussed w/ Dr. Carlisle and Dr. Junior     Thank you for the referral. Will continue to monitor the patient.  Please call with any questions [phone]  Above reviewed with Attending Dr. Junior     [facility]  [address] [place] [address]  [phone]  *Note not finalized until signed by Attending Physician   Patient with a past medical history of reported thrombocytopenia (hasnt been f/u in 5+years) sent to ER by pcp for abnormal routine labs. 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 %  Nucleated RBC: 2 /100 WBCs    #Pancytopenia   -patient stated has was getting routine blood work at PCP office in [place] Dr. Khan and was told to come to ER for4 abnormal labs  -as per patient has hx of thrombocytopenia but hasn't been an issue besides can brusie easily at time, does not remember hematologist that he saw and stated hasnt followed w/them in 5+ years  -patient denies fever, chill, night sweats or weight loss but does admit that he has had a URI on and off for past month w/ runny nose  -denies any auto-immune disorders  -manual smear showing 3.5% blast concerning for acute leukemia   RECS:  -send uric acid LDH, HIV, hep panel, G6PD  -will send flow cyt (ordered)  -please send anemia w/u (total iron, TIBC, %sat, ferritin, TSH, B12, Folate)  -send DIC panel (fibrinogen D-Dimer)  -maintain hgb >7 or if symptomatic   -Keep plt>10, Febrile in last 24hours >15K, LP>40K, Non-major bleeding/invasive procedures >50K, Major bleeding >80K       -discussed w/ Dr. Carlisle and Dr. Junior     Thank you for the referral. Will continue to monitor the patient.  Please call with any questions [phone]  Above reviewed with Attending Dr. Junior     [place] [address] [place] [address]  [phone]  *Note not finalized until signed by Attending Physician   Patient with a past medical history of reported thrombocytopenia (hasnt been f/u in 5+years) sent to ER by pcp for abnormal routine labs. 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 %  Nucleated RBC: 2 /100 WBCs    #Pancytopenia   -patient stated has was getting routine blood work at PCP office in [place] Dr. Khan and was told to come to ER for4 abnormal labs  -as per patient has hx of thrombocytopenia but hasn't been an issue besides can brusie easily at time, does not remember hematologist that he saw and stated hasnt followed w/them in 5+ years  -patient denies fever, chill, night sweats or weight loss but does admit that he has had a URI on and off for past month w/ runny nose  -denies any auto-immune disorders  -manual smear showing 3.5% blast concerning for acute leukemia   RECS:  -send uric acid LDH, HIV, hep panel, G6PD  -will send flow cyt (ordered)  -please send anemia w/u (total iron, TIBC, %sat, ferritin, TSH, B12, Folate)  -send DIC panel (fibrinogen D-Dimer)  -maintain hgb >7 or if symptomatic   -Keep plt>10, Febrile in last 24hours >15K, LP>40K, Non-major bleeding/invasive procedures >50K, Major bleeding >80K       -discussed w/ Dr. Carlisle and Dr. Junior     Thank you for the referral. Will continue to monitor the patient.  Please call with any questions [phone]  Above reviewed with Attending Dr. Junior     [place] [address] [place] [address]  [phone]  *Note not finalized until signed by Attending Physician  Patient with a past medical history of reported thrombocytopenia (hasnt been f/u in 5+years) sent to ER by pcp for abnormal routine labs. 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 %  Nucleated RBC: 2 /100 WBCs    #Pancytopenia   -patient stated has was getting routine blood work at PCP office in [place] Dr. Khan and was told to come to ER for4 abnormal labs  -as per patient has hx of thrombocytopenia but hasn't been an issue besides can bruise easily at time, does not remember hematologist that he saw and stated hasnt followed w/them in 5+ years  -patient denies fever, chill, night sweats or weight loss but does admit that he has had a URI on and off for past month w/ runny nose  -denies any auto-immune disorders  -manual smear showing 3.5% blast concerning for acute leukemia     RECS:  -send uric acid LDH, HIV, hep panel, G6PD  -will send flow cyt (ordered)  -please send anemia w/u (total iron, TIBC, %sat, ferritin, TSH, B12, Folate)  -send DIC panel (fibrinogen D-Dimer)  -maintain hgb >7 or if symptomatic   -Keep plt>10, Febrile in last 24hours >15K, LP>40K, Non-major bleeding/invasive procedures >50K, Major bleeding >80K       -discussed w/ Dr. Carlisle and Dr. Junior     Thank you for the referral. Will continue to monitor the patient.  Please call with any questions [phone]  Above reviewed with Attending Dr. Junior     [place] [address] [place] [address]  [phone]  *Note not finalized until signed by Attending Physician  Patient with a past medical history of reported thrombocytopenia (hasnt been f/u in 5+years) sent to ER by pcp for abnormal routine labs. 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 %  Nucleated RBC: 2 /100 WBCs    #Pancytopenia   -patient stated has was getting routine blood work at PCP office in [place] Dr. Khan and was told to come to ER for4 abnormal labs  -as per patient has hx of thrombocytopenia but hasn't been an issue besides can bruise easily at time, does not remember hematologist that he saw and stated hasnt followed w/them in 5+ years  -patient denies fever, chill, night sweats or weight loss but does admit that he has had a URI on and off for past month w/ runny nose  -denies any auto-immune disorders  -manual smear showing 3.5% blast concerning for acute leukemia     RECS:  -send uric acid LDH, HIV, hep panel, G6PD  -will send flow cyt (ordered)  -please send anemia w/u (total iron, TIBC, %sat, ferritin, TSH, B12, Folate)  -send DIC panel (fibrinogen D-Dimer)  -maintain hgb >7 or if symptomatic   -Keep plt>10, Febrile in last 24hours >15K, LP>40K, Non-major bleeding/invasive procedures >50K, Major bleeding >80K       -discussed w/ Dr. Carlisle and Dr. Junior     Thank you for the referral. Will continue to monitor the patient.  Please call with any questions [phone]  Above reviewed with Attending Dr. Junior     [place] [address] [place] [address]  [phone]  *Note not finalized until signed by Attending Physician

## 2024-01-10 NOTE — H&P ADULT - HISTORY OF PRESENT ILLNESS
75 y/o M with no PMH presents for abnormal outpatient labs. The patient states he went to his primary care physician and on routine labs drawn today he was found to have a low hemoglobin and platelets and was sent to the hospital for further work up.     Attempted ER to ER transfer however declined.    73 y/o M with no PMH presents for abnormal outpatient labs. The patient states he went to his primary care physician and on routine labs drawn today he was found to have a low hemoglobin and platelets and was sent to the hospital for further work up.     Attempted ER to ER transfer however declined.

## 2024-01-10 NOTE — H&P ADULT - ASSESSMENT
75 y/o M with no PMH presents for abnormal outpatient labs significant for leukopenia, anemia and thrombocytopenia admitted for possible leukemia.     Pancytopenia  - Concern for acute leukemia  - WBC 0.68  - Hemoglobin 8.4  - Platelet 30  - Blasts 3.5%  -   - Uric Acid 5.1  - Hematology consulted  - HIV negative  - Ferritin, TIBC, LDH, Uric Acid, Vitamin B12 pending  - Follow blood cultures in process  - Acute Hepatitis Panel in process  - Flow Cytometry in process  - G6PD in process    DVT ppx  - No ppx given anemia and thrombocytopenia   73 y/o M with no PMH presents for abnormal outpatient labs significant for leukopenia, anemia and thrombocytopenia admitted for possible leukemia.     Pancytopenia  - Concern for acute leukemia  - WBC 0.68  - Hemoglobin 8.4  - Platelet 30  - Blasts 3.5%  -   - Uric Acid 5.1  - Hematology consulted  - HIV negative  - Ferritin, TIBC, LDH, Uric Acid, Vitamin B12 pending  - Follow blood cultures in process  - Acute Hepatitis Panel in process  - Flow Cytometry in process  - G6PD in process    DVT ppx  - No ppx given anemia and thrombocytopenia

## 2024-01-10 NOTE — ED ADULT NURSE NOTE - NSFALLUNIVINTERV_ED_ALL_ED
Bed/Stretcher in lowest position, wheels locked, appropriate side rails in place/Call bell, personal items and telephone in reach/Instruct patient to call for assistance before getting out of bed/chair/stretcher/Non-slip footwear applied when patient is off stretcher/[place] to call system/Physically safe environment - no spills, clutter or unnecessary equipment/Purposeful proactive rounding/Room/bathroom lighting operational, light cord in reach Bed/Stretcher in lowest position, wheels locked, appropriate side rails in place/Call bell, personal items and telephone in reach/Instruct patient to call for assistance before getting out of bed/chair/stretcher/Non-slip footwear applied when patient is off stretcher/[place] to call system/Physically safe environment - no spills, clutter or unnecessary equipment/Purposeful proactive rounding/Room/bathroom lighting operational, light cord in reach

## 2024-01-10 NOTE — ED PROVIDER NOTE - CLINICAL SUMMARY MEDICAL DECISION MAKING FREE TEXT BOX
Pt with concern for thrombocytopenia of unclear origin, will recheck lab work here. will also assess for anemia. With chest pain, will check for ACS with troponin and ecg. however if anemic, there may be component of demand ischemia causing symptoms. call also placed to his PCP dr. rice for more information regarding patient's history.

## 2024-01-10 NOTE — ED PROVIDER NOTE - PROGRESS NOTE DETAILS
Spoke with nurse practitioner from Dr. Gibbs's office, patient reportedly had pancytopenia on lab work done yesterday. No hx of this. This is why he was referred to ED. Arjun Carlisle MD. Patient seen by hematology team, initially recommended transfer, now the plan is to keep patient at [facility] for further workup.  Spoke with Dr. Amaya for admission.  Arjun Carlisle MD. Patient seen by hematology team, initially recommended transfer, now the plan is to keep patient at [facility] for further workup.  Spoke with Dr. mAaya for admission.  Arjun Carlisle MD.

## 2024-01-10 NOTE — CONSULT NOTE ADULT - SUBJECTIVE AND OBJECTIVE BOX
Hematology Consult Note    HPI:   Patient with a past medical history of reported thrombocytopenia is presenting with concern for abnormal labs.  States that he had routine lab work done yesterday and was told to come to ED for further evaluation. States he will sometimes bruise easily, but not currently.    Allergies    penicillin (Urticaria)    Intolerances        MEDICATIONS  (STANDING):    MEDICATIONS  (PRN):      PAST MEDICAL & SURGICAL HISTORY:  Cough      No significant past surgical history          FAMILY HISTORY:  No pertinent family history in first degree relatives        SOCIAL HISTORY: No EtOH, no tobacco    REVIEW OF SYSTEMS:    CONSTITUTIONAL: No weakness, fevers or chills  EYES/ENT: No visual changes;  No vertigo or throat pain   NECK: No pain or stiffness  RESPIRATORY: some DIAS  CARDIOVASCULAR: No chest pain or palpitations  GASTROINTESTINAL: No abdominal or epigastric pain. No nausea, vomiting, or hematemesis; No diarrhea or constipation. No melena or hematochezia.  GENITOURINARY: No dysuria, frequency or hematuria  NEUROLOGICAL: No numbness or weakness  SKIN: No itching, burning, rashes, or lesions   All other review of systems is negative unless indicated above.    Height (cm): 154.9 (01-10 @ 10:47)  Weight (kg): 77.1 (01-10 @ 10:47)  BMI (kg/m2): 32.1 (01-10 @ 10:47)  BSA (m2): 1.76 (01-10 @ 10:47)    T(F): 99.1 (01-10-24 @ 10:34), Max: 99.1 (01-10-24 @ 10:34)  HR: 91 (01-10-24 @ 10:34)  BP: 145/80 (01-10-24 @ 10:34)  RR: 18 (01-10-24 @ 10:34)  SpO2: 98% (01-10-24 @ 10:34)  Wt(kg): --    GENERAL: NAD, well-developed  HEAD:  Atraumatic, Normocephalic  EYES: EOMI, PERRLA, conjunctiva and sclera clear  NECK: Supple, No JVD  CHEST/LUNG: Clear to auscultation bilaterally; No wheeze  HEART: Regular rate and rhythm; No murmurs, rubs, or gallops  ABDOMEN: Soft, Nontender, Nondistended; Bowel sounds present  EXTREMITIES:  2+ Peripheral Pulses, No clubbing, cyanosis, or edema  NEUROLOGY: non-focal  SKIN: No rashes or lesions                          8.4    0.68  )-----------( 30       ( 10 Saleem 2024 12:00 )             27.4       01-10    139  |  104  |  11.5  ----------------------------<  118<H>  4.0   |  24.0  |  0.71    Ca    8.7      10 Saleem 2024 12:00  Mg     2.3     01-10    TPro  7.2  /  Alb  4.4  /  TBili  0.5  /  DBili  x   /  AST  17  /  ALT  13  /  AlkPhos  116  01-10      Magnesium: 2.3 mg/dL (01-10 @ 12:00)       Hematology Consult Note    HPI:   Patient with a past medical history of reported thrombocytopenia is presenting with concern for abnormal labs.  States that he had routine lab work done yesterday and was told to come to ED for further evaluation. States he will sometimes bruise easily, but not currently.    Allergies    penicillin (Urticaria)    Intolerances        MEDICATIONS  (STANDING):    MEDICATIONS  (PRN):      PAST MEDICAL & SURGICAL HISTORY:  Cough      No significant past surgical history          FAMILY HISTORY:  No pertinent family history in first degree relatives        SOCIAL HISTORY: No EtOH, no tobacco    REVIEW OF SYSTEMS:    CONSTITUTIONAL: No weakness, fevers or chills  EYES/ENT: No visual changes;  No vertigo or throat pain   NECK: No pain or stiffness  RESPIRATORY: some DIAS  CARDIOVASCULAR: No chest pain or palpitations  GASTROINTESTINAL: No abdominal or epigastric pain. No nausea, vomiting, or hematemesis; No diarrhea or constipation. No melena or hematochezia.  GENITOURINARY: No dysuria, frequency or hematuria  NEUROLOGICAL: No numbness or weakness  SKIN: No itching, burning, rashes, or lesions   All other review of systems is negative unless indicated above.    Height (cm): 154.9 (01-10 @ 10:47)  Weight (kg): 77.1 (01-10 @ 10:47)  BMI (kg/m2): 32.1 (01-10 @ 10:47)  BSA (m2): 1.76 (01-10 @ 10:47)    T(F): 99.1 (01-10-24 @ 10:34), Max: 99.1 (01-10-24 @ 10:34)  HR: 91 (01-10-24 @ 10:34)  BP: 145/80 (01-10-24 @ 10:34)  RR: 18 (01-10-24 @ 10:34)  SpO2: 98% (01-10-24 @ 10:34)  Wt(kg): --    GENERAL: NAD, well-developed  HEAD:  Atraumatic, Normocephalic  EYES: EOMI, PERRLA, conjunctiva and sclera clear  NECK: Supple, No JVD  CHEST/LUNG: Clear to auscultation bilaterally; No wheeze  HEART: Regular rate and rhythm; No murmurs, rubs, or gallops  ABDOMEN: Soft, Nontender, Nondistended; Bowel sounds present  EXTREMITIES:  2+ Peripheral Pulses, No clubbing, cyanosis, or edema  NEUROLOGY: non-focal  SKIN: No rashes or lesions                          8.4    0.68  )-----------( 30       ( 10 Saleem 2024 12:00 )             27.4       01-10    139  |  104  |  11.5  ----------------------------<  118<H>  4.0   |  24.0  |  0.71    Ca    8.7      10 Saleem 2024 12:00  Mg     2.3     01-10  omplete Blood Count + Automated Diff (01.10.24 @ 12:00)   WBC Count: 0.68: TYPE:(C=Critical, N=Notification, A=Abnormal) C   TESTS: WBC   DATE/TIME CALLED: 01/10/2024 12:29:07 EST   CALLED TO: DR. TERE CLARK   READ BACK (2 Patient Identifiers)(Y/N): Y   READ BACK VALUES (Y/N): Y   CALLED BY: NC K/uL  RBC Count: 3.56 M/uL  Hemoglobin: 8.4 g/dL  Hematocrit: 27.4 %  Mean Cell Volume: 77.0 fl  Mean Cell Hemoglobin: 23.6 pg  Mean Cell Hemoglobin Conc: 30.7 gm/dL  Red Cell Distrib Width: 25.2 %  Platelet Count - Automated: 30: Test Repeated Specimen integrity verified. K/uL  Auto Neutrophil #: 0.46 K/uL  Auto Lymphocyte #: 0.14 K/uL  Auto Monocyte #: 0.01 K/uL  Auto Eosinophil #: 0.01 K/uL  Auto Basophil #: 0.01 K/uL  Auto Neutrophil %: 67.3: Differential percentages must be correlated with absolute numbers for   clinical significance. %  Auto Lymphocyte %: 21.2 %  Auto Monocyte %: 1.8 %  Auto Eosinophil %: 0.9 %  Auto Basophil %: 0.9 %    TPro  7.2  /  Alb  4.4  /  TBili  0.5  /  DBili  x   /  AST  17  /  ALT  13  /  AlkPhos  116  01-10      Magnesium: 2.3 mg/dL (01-10 @ 12:00)

## 2024-01-10 NOTE — H&P ADULT - NSHPPHYSICALEXAM_GEN_ALL_CORE
Vital Signs Last 24 Hrs  T(F): 98.1 (10 Saleem 2024 15:25), Max: 99.1 (10 Saleem 2024 10:34)  HR: 72 (10 Saleem 2024 15:25) (72 - 91)  BP: 139/72 (10 Saleem 2024 15:25) (139/72 - 145/80)  RR: 18 (10 Saleem 2024 15:25) (18 - 18)  SpO2: 95% (10 Saleem 2024 15:25) (95% - 98%)    Physical Exam:  Constitutional: alert and oriented, in no acute distress   Neck: Soft and supple  Respiratory: Clear to auscultation bilaterally, no wheezes or crackles  Cardiovascular: Regular rate and rhythm, no murmurs, gallops, rubs  Gastrointestinal: Soft, non-tender to palpation, +bs  Vascular: 2+ peripheral pulses  Neurological: A/O x 3, no focal neurological deficits  Musculoskeletal: 5/5 strength b/l upper and lower extremities, no lower extremity edema bilaterally  Psych: Answers questions appropriately

## 2024-01-10 NOTE — ED ADULT TRIAGE NOTE - CHIEF COMPLAINT QUOTE
went to  yesterday for check up and called and told to come to ED for abnormal labs. Does not know what they are. Also c/o chest pains. ED  at bedside.

## 2024-01-10 NOTE — ED PROVIDER NOTE - OBJECTIVE STATEMENT
Patient with a past medical history of reported thrombocytopenia is presenting with concern for abnormal labs.  States that he had routine lab work done yesterday and was told to come to ED for further evaluation. States he will sometimes bruise easily, but not currently. Denies any fatigue or black/tarry/bloody stools. Also has had 6 months of intermittent chest pain. No changes today compared to previous episodes. Denies associated shortness of breath with this. No nausea or vomiting. Denies hx cardiac disease. States he was told about his low platelets about 5 years ago.

## 2024-01-10 NOTE — H&P ADULT - NSHPLABSRESULTS_GEN_ALL_CORE
8.4    0.68  )-----------( 30       ( 10 Saleem 2024 12:00 )             27.4   01-10    139  |  104  |  11.5  ----------------------------<  118<H>  4.0   |  24.0  |  0.71    Ca    8.7      10 Saleem 2024 12:00  Mg     2.3     01-10    TPro  7.2  /  Alb  4.4  /  TBili  0.5  /  DBili  x   /  AST  17  /  ALT  13  /  AlkPhos  116  01-10

## 2024-01-11 LAB
ALBUMIN SERPL ELPH-MCNC: 4.2 G/DL — SIGNIFICANT CHANGE UP (ref 3.3–5.2)
ALBUMIN SERPL ELPH-MCNC: 4.2 G/DL — SIGNIFICANT CHANGE UP (ref 3.3–5.2)
ALP SERPL-CCNC: 107 U/L — SIGNIFICANT CHANGE UP (ref 40–120)
ALP SERPL-CCNC: 107 U/L — SIGNIFICANT CHANGE UP (ref 40–120)
ALT FLD-CCNC: 12 U/L — SIGNIFICANT CHANGE UP
ALT FLD-CCNC: 12 U/L — SIGNIFICANT CHANGE UP
ANION GAP SERPL CALC-SCNC: 9 MMOL/L — SIGNIFICANT CHANGE UP (ref 5–17)
ANION GAP SERPL CALC-SCNC: 9 MMOL/L — SIGNIFICANT CHANGE UP (ref 5–17)
ANISOCYTOSIS BLD QL: SLIGHT — SIGNIFICANT CHANGE UP
ANISOCYTOSIS BLD QL: SLIGHT — SIGNIFICANT CHANGE UP
AST SERPL-CCNC: 16 U/L — SIGNIFICANT CHANGE UP
AST SERPL-CCNC: 16 U/L — SIGNIFICANT CHANGE UP
BASOPHILS # BLD AUTO: 0 K/UL — SIGNIFICANT CHANGE UP (ref 0–0.2)
BASOPHILS # BLD AUTO: 0 K/UL — SIGNIFICANT CHANGE UP (ref 0–0.2)
BASOPHILS NFR BLD AUTO: 0 % — SIGNIFICANT CHANGE UP (ref 0–2)
BASOPHILS NFR BLD AUTO: 0 % — SIGNIFICANT CHANGE UP (ref 0–2)
BILIRUB DIRECT SERPL-MCNC: 0.2 MG/DL — SIGNIFICANT CHANGE UP (ref 0–0.3)
BILIRUB DIRECT SERPL-MCNC: 0.2 MG/DL — SIGNIFICANT CHANGE UP (ref 0–0.3)
BILIRUB INDIRECT FLD-MCNC: 0.5 MG/DL — SIGNIFICANT CHANGE UP (ref 0.2–1)
BILIRUB INDIRECT FLD-MCNC: 0.5 MG/DL — SIGNIFICANT CHANGE UP (ref 0.2–1)
BILIRUB SERPL-MCNC: 0.7 MG/DL — SIGNIFICANT CHANGE UP (ref 0.4–2)
BILIRUB SERPL-MCNC: 0.7 MG/DL — SIGNIFICANT CHANGE UP (ref 0.4–2)
BLASTS # FLD: 1.7 % — HIGH (ref 0–0)
BLASTS # FLD: 1.7 % — HIGH (ref 0–0)
BUN SERPL-MCNC: 10.2 MG/DL — SIGNIFICANT CHANGE UP (ref 8–20)
BUN SERPL-MCNC: 10.2 MG/DL — SIGNIFICANT CHANGE UP (ref 8–20)
CALCIUM SERPL-MCNC: 8.4 MG/DL — SIGNIFICANT CHANGE UP (ref 8.4–10.5)
CALCIUM SERPL-MCNC: 8.4 MG/DL — SIGNIFICANT CHANGE UP (ref 8.4–10.5)
CHLORIDE SERPL-SCNC: 106 MMOL/L — SIGNIFICANT CHANGE UP (ref 96–108)
CHLORIDE SERPL-SCNC: 106 MMOL/L — SIGNIFICANT CHANGE UP (ref 96–108)
CO2 SERPL-SCNC: 25 MMOL/L — SIGNIFICANT CHANGE UP (ref 22–29)
CO2 SERPL-SCNC: 25 MMOL/L — SIGNIFICANT CHANGE UP (ref 22–29)
CREAT SERPL-MCNC: 0.61 MG/DL — SIGNIFICANT CHANGE UP (ref 0.5–1.3)
CREAT SERPL-MCNC: 0.61 MG/DL — SIGNIFICANT CHANGE UP (ref 0.5–1.3)
DACRYOCYTES BLD QL SMEAR: SLIGHT — SIGNIFICANT CHANGE UP
DACRYOCYTES BLD QL SMEAR: SLIGHT — SIGNIFICANT CHANGE UP
EGFR: 102 ML/MIN/1.73M2 — SIGNIFICANT CHANGE UP
EGFR: 102 ML/MIN/1.73M2 — SIGNIFICANT CHANGE UP
ELLIPTOCYTES BLD QL SMEAR: SIGNIFICANT CHANGE UP
ELLIPTOCYTES BLD QL SMEAR: SIGNIFICANT CHANGE UP
EOSINOPHIL # BLD AUTO: 0 K/UL — SIGNIFICANT CHANGE UP (ref 0–0.5)
EOSINOPHIL # BLD AUTO: 0 K/UL — SIGNIFICANT CHANGE UP (ref 0–0.5)
EOSINOPHIL NFR BLD AUTO: 0 % — SIGNIFICANT CHANGE UP (ref 0–6)
EOSINOPHIL NFR BLD AUTO: 0 % — SIGNIFICANT CHANGE UP (ref 0–6)
GIANT PLATELETS BLD QL SMEAR: PRESENT — SIGNIFICANT CHANGE UP
GIANT PLATELETS BLD QL SMEAR: PRESENT — SIGNIFICANT CHANGE UP
GLUCOSE SERPL-MCNC: 91 MG/DL — SIGNIFICANT CHANGE UP (ref 70–99)
GLUCOSE SERPL-MCNC: 91 MG/DL — SIGNIFICANT CHANGE UP (ref 70–99)
HAV IGM SER-ACNC: SIGNIFICANT CHANGE UP
HAV IGM SER-ACNC: SIGNIFICANT CHANGE UP
HBV CORE IGM SER-ACNC: SIGNIFICANT CHANGE UP
HBV CORE IGM SER-ACNC: SIGNIFICANT CHANGE UP
HBV SURFACE AG SER-ACNC: SIGNIFICANT CHANGE UP
HBV SURFACE AG SER-ACNC: SIGNIFICANT CHANGE UP
HCT VFR BLD CALC: 26.6 % — LOW (ref 39–50)
HCT VFR BLD CALC: 26.6 % — LOW (ref 39–50)
HCV AB S/CO SERPL IA: 0.11 S/CO — SIGNIFICANT CHANGE UP (ref 0–0.99)
HCV AB S/CO SERPL IA: 0.11 S/CO — SIGNIFICANT CHANGE UP (ref 0–0.99)
HCV AB S/CO SERPL IA: 0.12 S/CO — SIGNIFICANT CHANGE UP (ref 0–0.99)
HCV AB S/CO SERPL IA: 0.12 S/CO — SIGNIFICANT CHANGE UP (ref 0–0.99)
HCV AB SERPL-IMP: SIGNIFICANT CHANGE UP
HGB BLD-MCNC: 8.3 G/DL — LOW (ref 13–17)
HGB BLD-MCNC: 8.3 G/DL — LOW (ref 13–17)
HYPOCHROMIA BLD QL: SLIGHT — SIGNIFICANT CHANGE UP
HYPOCHROMIA BLD QL: SLIGHT — SIGNIFICANT CHANGE UP
LYMPHOCYTES # BLD AUTO: 0.25 K/UL — LOW (ref 1–3.3)
LYMPHOCYTES # BLD AUTO: 0.25 K/UL — LOW (ref 1–3.3)
LYMPHOCYTES # BLD AUTO: 37.9 % — SIGNIFICANT CHANGE UP (ref 13–44)
LYMPHOCYTES # BLD AUTO: 37.9 % — SIGNIFICANT CHANGE UP (ref 13–44)
MACROCYTES BLD QL: SLIGHT — SIGNIFICANT CHANGE UP
MACROCYTES BLD QL: SLIGHT — SIGNIFICANT CHANGE UP
MANUAL SMEAR VERIFICATION: SIGNIFICANT CHANGE UP
MANUAL SMEAR VERIFICATION: SIGNIFICANT CHANGE UP
MCHC RBC-ENTMCNC: 23.8 PG — LOW (ref 27–34)
MCHC RBC-ENTMCNC: 23.8 PG — LOW (ref 27–34)
MCHC RBC-ENTMCNC: 31.2 GM/DL — LOW (ref 32–36)
MCHC RBC-ENTMCNC: 31.2 GM/DL — LOW (ref 32–36)
MCV RBC AUTO: 76.2 FL — LOW (ref 80–100)
MCV RBC AUTO: 76.2 FL — LOW (ref 80–100)
MICROCYTES BLD QL: SLIGHT — SIGNIFICANT CHANGE UP
MICROCYTES BLD QL: SLIGHT — SIGNIFICANT CHANGE UP
MONOCYTES # BLD AUTO: 0.01 K/UL — SIGNIFICANT CHANGE UP (ref 0–0.9)
MONOCYTES # BLD AUTO: 0.01 K/UL — SIGNIFICANT CHANGE UP (ref 0–0.9)
MONOCYTES NFR BLD AUTO: 0.9 % — LOW (ref 2–14)
MONOCYTES NFR BLD AUTO: 0.9 % — LOW (ref 2–14)
NEUTROPHILS # BLD AUTO: 0.34 K/UL — LOW (ref 1.8–7.4)
NEUTROPHILS # BLD AUTO: 0.34 K/UL — LOW (ref 1.8–7.4)
NEUTROPHILS NFR BLD AUTO: 50.9 % — SIGNIFICANT CHANGE UP (ref 43–77)
NEUTROPHILS NFR BLD AUTO: 50.9 % — SIGNIFICANT CHANGE UP (ref 43–77)
OVALOCYTES BLD QL SMEAR: SIGNIFICANT CHANGE UP
OVALOCYTES BLD QL SMEAR: SIGNIFICANT CHANGE UP
PLAT MORPH BLD: NORMAL — SIGNIFICANT CHANGE UP
PLAT MORPH BLD: NORMAL — SIGNIFICANT CHANGE UP
PLATELET # BLD AUTO: 26 K/UL — LOW (ref 150–400)
PLATELET # BLD AUTO: 26 K/UL — LOW (ref 150–400)
POIKILOCYTOSIS BLD QL AUTO: SIGNIFICANT CHANGE UP
POIKILOCYTOSIS BLD QL AUTO: SIGNIFICANT CHANGE UP
POLYCHROMASIA BLD QL SMEAR: SLIGHT — SIGNIFICANT CHANGE UP
POLYCHROMASIA BLD QL SMEAR: SLIGHT — SIGNIFICANT CHANGE UP
POTASSIUM SERPL-MCNC: 4.1 MMOL/L — SIGNIFICANT CHANGE UP (ref 3.5–5.3)
POTASSIUM SERPL-MCNC: 4.1 MMOL/L — SIGNIFICANT CHANGE UP (ref 3.5–5.3)
POTASSIUM SERPL-SCNC: 4.1 MMOL/L — SIGNIFICANT CHANGE UP (ref 3.5–5.3)
POTASSIUM SERPL-SCNC: 4.1 MMOL/L — SIGNIFICANT CHANGE UP (ref 3.5–5.3)
PROT SERPL-MCNC: 6.4 G/DL — LOW (ref 6.6–8.7)
PROT SERPL-MCNC: 6.4 G/DL — LOW (ref 6.6–8.7)
RBC # BLD: 3.49 M/UL — LOW (ref 4.2–5.8)
RBC # BLD: 3.49 M/UL — LOW (ref 4.2–5.8)
RBC # FLD: 25.2 % — HIGH (ref 10.3–14.5)
RBC # FLD: 25.2 % — HIGH (ref 10.3–14.5)
RBC BLD AUTO: ABNORMAL
RBC BLD AUTO: ABNORMAL
SODIUM SERPL-SCNC: 140 MMOL/L — SIGNIFICANT CHANGE UP (ref 135–145)
SODIUM SERPL-SCNC: 140 MMOL/L — SIGNIFICANT CHANGE UP (ref 135–145)
VARIANT LYMPHS # BLD: 8.6 % — HIGH (ref 0–6)
VARIANT LYMPHS # BLD: 8.6 % — HIGH (ref 0–6)
WBC # BLD: 0.67 K/UL — CRITICAL LOW (ref 3.8–10.5)
WBC # BLD: 0.67 K/UL — CRITICAL LOW (ref 3.8–10.5)
WBC # FLD AUTO: 0.67 K/UL — CRITICAL LOW (ref 3.8–10.5)
WBC # FLD AUTO: 0.67 K/UL — CRITICAL LOW (ref 3.8–10.5)

## 2024-01-11 PROCEDURE — 99232 SBSQ HOSP IP/OBS MODERATE 35: CPT

## 2024-01-11 NOTE — PROGRESS NOTE ADULT - ASSESSMENT
73 y/o M with no PMH presents for abnormal outpatient labs significant for leukopenia, anemia and thrombocytopenia admitted for possible leukemia.     Pancytopenia  - Concern for acute leukemia  - trend CBC with diff  - Hematology consulted  - HIV negative  - Ferritin, TIBC, LDH, Uric Acid, Vitamin B12 normal  - Follow blood cultures in process  - Acute Hepatitis Panel normal  - Flow Cytometry in process  - G6PD in process    DVT ppx  - No ppx given anemia and thrombocytopenia    Dispo: pending flow cytometry result, likely need transfer if +leukemia

## 2024-01-11 NOTE — PATIENT PROFILE ADULT - FALL HARM RISK - HARM RISK INTERVENTIONS
Communicate Risk of Fall with Harm to all staff/Reinforce activity limits and safety measures with patient and family/Tailored Fall Risk Interventions/Visual Cue: Yellow wristband and red socks/Bed in lowest position, wheels locked, appropriate side rails in place/Call bell, personal items and telephone in reach/Instruct patient to call for assistance before getting out of bed or chair/Non-slip footwear when patient is out of bed/[place] to call system/Physically safe environment - no spills, clutter or unnecessary equipment/Purposeful Proactive Rounding/Room/bathroom lighting operational, light cord in reach Communicate Risk of Fall with Harm to all staff/Reinforce activity limits and safety measures with patient and family/Tailored Fall Risk Interventions/Visual Cue: Yellow wristband and red socks/Bed in lowest position, wheels locked, appropriate side rails in place/Call bell, personal items and telephone in reach/Instruct patient to call for assistance before getting out of bed or chair/Non-slip footwear when patient is out of bed/[place] to call system/Physically safe environment - no spills, clutter or unnecessary equipment/Purposeful Proactive Rounding/Room/bathroom lighting operational, light cord in reach

## 2024-01-11 NOTE — PROGRESS NOTE ADULT - ASSESSMENT
Patient with a past medical history of reported thrombocytopenia (hasnt been f/u in 5+years) sent to ER by pcp for abnormal routine labs. Found to be pancytopenic     [facility] stated lastorvious BW 3/2023- WBC-3.26, hgb-13, plt-44    Lactate Dehydrogenase, Serum - 206 U/L, Uric Acid: 5.0 mg/dL, D-Dimer Assay, Quantitative: <150:  Rapid HIV-1/2 Antibody (01.10.24 @ 14:17) -Nonreact:  Hepatitis C Virus Interpretation: Nonreact:  Hepatitis B Core IgM Antibody: Nonreact  Hepatitis B Surface Antigen: Nonreact  Hepatitis A IgM Antibody: Nonreact  Manual Differential (01.10.24 @ 12:00)   Tear Drops: Slight  Poikilocytosis: Marked  Polychromasia: Slight  Schistocytes: Slight  Ovalocytes: Moderate  Microcytosis: Moderate  Anisocytosis: Moderate  Elliptocytes: Moderate  Reactive Lymphocytes %: 4.4 %  Giant Platelets: Present  Manual Smear Verification: Performed  Red Cell Morphology: Abnormal  Platelet Morphology: Normal  Blasts %: 3.5: Reviewed by pathologist. %  Nucleated RBC: 2 /100 WBCs    #Pancytopenia   -patient stated has was getting routine blood work at PCP office in [place] Dr. Khan and was told to come to ER for4 abnormal labs  -as per patient has hx of thrombocytopenia but hasn't been an issue besides can bruise easily at time, does not remember hematologist that he saw and stated hasnt followed w/them in 5+ years  -patient denies fever, chill, night sweats or weight loss but does admit that he has had a URI on and off for past month w/ runny nose  -denies any auto-immune disorders  -manual smear initially showing 1/10/24showing 3.5% blast concerning for MDS/ acute leukemia   -smear personally reviewed 1/11/24 w/ Dr. Junior showing Normocytic RBC w/ Anisocytosis, Poikilocytosis, minimal blast   -discussed importance of need for BM BX to patient and family in length, after discussion they felt they needed some more time to think about it  RECS:  -pending G6PD  -pending flow cyt -spoke w/ flow lab to be expedited   -pending Fibrinogen   -consider consult IR for BM bX if patient amendable   -maintain hgb >7 or if symptomatic   -Keep plt>10, Febrile in last 24hours >15K, LP>40K, Non-major bleeding/invasive procedures >50K, Major bleeding >80K            Will continue to monitor the patient.  Please call with any questions [phone]  Above reviewed with Attending Dr. Junior     [facility]  [address] [place] [address]  [phone]  *Note not finalized until signed by Attending Physician  Patient with a past medical history of reported thrombocytopenia (hasnt been f/u in 5+years) sent to ER by pcp for abnormal routine labs. Found to be pancytopenic     [facility] stated lastorvious BW 3/2023- WBC-3.26, hgb-13, plt-44    Lactate Dehydrogenase, Serum - 206 U/L, Uric Acid: 5.0 mg/dL, D-Dimer Assay, Quantitative: <150:  Rapid HIV-1/2 Antibody (01.10.24 @ 14:17) -Nonreact:  Hepatitis C Virus Interpretation: Nonreact:  Hepatitis B Core IgM Antibody: Nonreact  Hepatitis B Surface Antigen: Nonreact  Hepatitis A IgM Antibody: Nonreact  Manual Differential (01.10.24 @ 12:00)   Tear Drops: Slight  Poikilocytosis: Marked  Polychromasia: Slight  Schistocytes: Slight  Ovalocytes: Moderate  Microcytosis: Moderate  Anisocytosis: Moderate  Elliptocytes: Moderate  Reactive Lymphocytes %: 4.4 %  Giant Platelets: Present  Manual Smear Verification: Performed  Red Cell Morphology: Abnormal  Platelet Morphology: Normal  Blasts %: 3.5: Reviewed by pathologist. %  Nucleated RBC: 2 /100 WBCs    #Pancytopenia   -patient stated has was getting routine blood work at PCP office in [place] Dr. Khan and was told to come to ER for4 abnormal labs  -as per patient has hx of thrombocytopenia but hasn't been an issue besides can bruise easily at time, does not remember hematologist that he saw and stated hasnt followed w/them in 5+ years  -patient denies fever, chill, night sweats or weight loss but does admit that he has had a URI on and off for past month w/ runny nose  -denies any auto-immune disorders  -manual smear initially showing 1/10/24showing 3.5% blast concerning for MDS/ acute leukemia   -smear personally reviewed 1/11/24 w/ Dr. Junior showing Normocytic RBC w/ Anisocytosis, Poikilocytosis, minimal blast   -discussed importance of need for BM BX to patient and family in length, after discussion they felt they needed some more time to think about it  RECS:  -pending G6PD  -pending flow cyt -spoke w/ flow lab to be expedited   -pending Fibrinogen   -consider consult IR for BM bX if patient amendable   -maintain hgb >7 or if symptomatic   -Keep plt>10, Febrile in last 24hours >15K, LP>40K, Non-major bleeding/invasive procedures >50K, Major bleeding >80K            Will continue to monitor the patient.  Please call with any questions [phone]  Above reviewed with Attending Dr. Junior     [facility]  [address] [place] [address]  [phone]  *Note not finalized until signed by Attending Physician  Patient with a past medical history of reported thrombocytopenia (hasnt been f/u in 5+years) sent to ER by pcp for abnormal routine labs. Found to be pancytopenic     [facility]p [facility] stated lastorvious BW 3/2023- WBC-3.26, hgb-13, plt-44    Lactate Dehydrogenase, Serum - 206 U/L, Uric Acid: 5.0 mg/dL, D-Dimer Assay, Quantitative: <150:  Rapid HIV-1/2 Antibody (01.10.24 @ 14:17) -Nonreact:  Hepatitis C Virus Interpretation: Nonreact:  Hepatitis B Core IgM Antibody: Nonreact  Hepatitis B Surface Antigen: Nonreact  Hepatitis A IgM Antibody: Nonreact  Manual Differential (01.10.24 @ 12:00)   Tear Drops: Slight  Poikilocytosis: Marked  Polychromasia: Slight  Schistocytes: Slight  Ovalocytes: Moderate  Microcytosis: Moderate  Anisocytosis: Moderate  Elliptocytes: Moderate  Reactive Lymphocytes %: 4.4 %  Giant Platelets: Present  Manual Smear Verification: Performed  Red Cell Morphology: Abnormal  Platelet Morphology: Normal  Blasts %: 3.5: Reviewed by pathologist. %  Nucleated RBC: 2 /100 WBCs    #Pancytopenia   -patient stated has was getting routine blood work at PCP office in [place] Dr. Khan and was told to come to ER for abnormal labs  -as per patient has hx of thrombocytopenia but hasn't been an issue besides can bruise easily at time, does not remember hematologist that he saw and stated hasn't followed w/them in 5+ years  -patient denies fever, chill, night sweats or weight loss but does admit that he has had a URI on and off for past month w/ runny nose  -denies any auto-immune disorders  -periopheral smear initially showing 1/10/24 showing 3.5% blast concerning for MDS/ acute leukemia   -smear personally reviewed 1/11/24 w/ Dr. Junior showing Normocytic RBC w/ Anisocytosis, Poikilocytosis, elliptocytes with occasional blast like cells   -discussed importance of getting BM BX for definitive diagnosis to patient and family in length, after discussion they felt they needed some more time to think about it    RECS:  -pending G6PD  -pending periopheral flow cyt -spoke w/ flow lab to be expedited   -pending Fibrinogen   -consider consult IR for BM bX if patient amendable   -maintain hgb >7 or if symptomatic   -Keep plt>10, Febrile in last 24hours >15K, LP>40K, Non-major bleeding/invasive procedures >50K, Major bleeding >80K   -Will follow        Will continue to monitor the patient.  Please call with any questions [phone]  Above reviewed with Attending Dr. Junior     [facility] [address] [place] [address]  [phone]  *Note not finalized until signed by Attending Physician  Patient with a past medical history of reported thrombocytopenia (hasnt been f/u in 5+years) sent to ER by pcp for abnormal routine labs. Found to be pancytopenic     [facility]p [facility] stated lastorvious BW 3/2023- WBC-3.26, hgb-13, plt-44    Lactate Dehydrogenase, Serum - 206 U/L, Uric Acid: 5.0 mg/dL, D-Dimer Assay, Quantitative: <150:  Rapid HIV-1/2 Antibody (01.10.24 @ 14:17) -Nonreact:  Hepatitis C Virus Interpretation: Nonreact:  Hepatitis B Core IgM Antibody: Nonreact  Hepatitis B Surface Antigen: Nonreact  Hepatitis A IgM Antibody: Nonreact  Manual Differential (01.10.24 @ 12:00)   Tear Drops: Slight  Poikilocytosis: Marked  Polychromasia: Slight  Schistocytes: Slight  Ovalocytes: Moderate  Microcytosis: Moderate  Anisocytosis: Moderate  Elliptocytes: Moderate  Reactive Lymphocytes %: 4.4 %  Giant Platelets: Present  Manual Smear Verification: Performed  Red Cell Morphology: Abnormal  Platelet Morphology: Normal  Blasts %: 3.5: Reviewed by pathologist. %  Nucleated RBC: 2 /100 WBCs    #Pancytopenia   -patient stated has was getting routine blood work at PCP office in [place] Dr. Khan and was told to come to ER for abnormal labs  -as per patient has hx of thrombocytopenia but hasn't been an issue besides can bruise easily at time, does not remember hematologist that he saw and stated hasn't followed w/them in 5+ years  -patient denies fever, chill, night sweats or weight loss but does admit that he has had a URI on and off for past month w/ runny nose  -denies any auto-immune disorders  -periopheral smear initially showing 1/10/24 showing 3.5% blast concerning for MDS/ acute leukemia   -smear personally reviewed 1/11/24 w/ Dr. Junior showing Normocytic RBC w/ Anisocytosis, Poikilocytosis, elliptocytes with occasional blast like cells   -discussed importance of getting BM BX for definitive diagnosis to patient and family in length, after discussion they felt they needed some more time to think about it    RECS:  -pending G6PD  -pending periopheral flow cyt -spoke w/ flow lab to be expedited   -pending Fibrinogen   -consider consult IR for BM bX if patient amendable   -maintain hgb >7 or if symptomatic   -Keep plt>10, Febrile in last 24hours >15K, LP>40K, Non-major bleeding/invasive procedures >50K, Major bleeding >80K   -Will follow        Will continue to monitor the patient.  Please call with any questions [phone]  Above reviewed with Attending Dr. Junior     [facility] [address] [place] [address]  [phone]  *Note not finalized until signed by Attending Physician  Patient with a past medical history of reported thrombocytopenia (hasnt been f/u in 5+years) sent to ER by pcp for abnormal routine labs. Found to be pancytopenic     [facility]p [facility] stated lastorvious BW 3/2023- WBC-3.26, hgb-13, plt-44    Lactate Dehydrogenase, Serum - 206 U/L, Uric Acid: 5.0 mg/dL, D-Dimer Assay, Quantitative: <150:  Rapid HIV-1/2 Antibody (01.10.24 @ 14:17) -Nonreact:  Hepatitis C Virus Interpretation: Nonreact:  Hepatitis B Core IgM Antibody: Nonreact  Hepatitis B Surface Antigen: Nonreact  Hepatitis A IgM Antibody: Nonreact  Manual Differential (01.10.24 @ 12:00)   Tear Drops: Slight  Poikilocytosis: Marked  Polychromasia: Slight  Schistocytes: Slight  Ovalocytes: Moderate  Microcytosis: Moderate  Anisocytosis: Moderate  Elliptocytes: Moderate  Reactive Lymphocytes %: 4.4 %  Giant Platelets: Present  Manual Smear Verification: Performed  Red Cell Morphology: Abnormal  Platelet Morphology: Normal  Blasts %: 3.5: Reviewed by pathologist. %  Nucleated RBC: 2 /100 WBCs    #Pancytopenia   -patient stated has was getting routine blood work at PCP office in [place] Dr. Khan and was told to come to ER for abnormal labs  -as per patient has hx of thrombocytopenia but hasn't been an issue besides can bruise easily at time, does not remember hematologist that he saw and stated hasn't followed w/them in 5+ years  -patient denies fever, chill, night sweats or weight loss but does admit that he has had a URI on and off for past month w/ runny nose  -denies any auto-immune disorders  -periopheral smear initially showing 1/10/24 showing 3.5% blast concerning for MDS/ acute leukemia   -smear personally reviewed 1/11/24 w/ Dr. Junior showing Normocytic RBC w/ Anisocytosis, Poikilocytosis, elliptocytes with occasional blast like cells   -given long standing thrombocytopenia dating back to 2018, this could be MDS vs acute leukemia  -discussed importance of getting BM BX for definitive diagnosis to patient and family in length, after discussion they felt they needed some more time to think about it    RECS:  -pending G6PD  -pending periopheral flow cyt -spoke w/ flow lab to be expedited   -pending Fibrinogen   -consider consult IR for BM bX if patient amendable   -maintain hgb >7 or if symptomatic   -Keep plt>10, Febrile in last 24hours >15K, LP>40K, Non-major bleeding/invasive procedures >50K, Major bleeding >80K   -Will follow        Will continue to monitor the patient.  Please call with any questions [phone]  Above reviewed with Att[place] [address]  [phone]  *Note not finalized until signed by Attending Physician  Patient with a past medical history of reported thrombocytopenia (hasnt been f/u in 5+years) sent to ER by pcp for abnormal routine labs. Found to be pancytopenic     [facility]p [facility] stated lastorvious BW 3/2023- WBC-3.26, hgb-13, plt-44    Lactate Dehydrogenase, Serum - 206 U/L, Uric Acid: 5.0 mg/dL, D-Dimer Assay, Quantitative: <150:  Rapid HIV-1/2 Antibody (01.10.24 @ 14:17) -Nonreact:  Hepatitis C Virus Interpretation: Nonreact:  Hepatitis B Core IgM Antibody: Nonreact  Hepatitis B Surface Antigen: Nonreact  Hepatitis A IgM Antibody: Nonreact  Manual Differential (01.10.24 @ 12:00)   Tear Drops: Slight  Poikilocytosis: Marked  Polychromasia: Slight  Schistocytes: Slight  Ovalocytes: Moderate  Microcytosis: Moderate  Anisocytosis: Moderate  Elliptocytes: Moderate  Reactive Lymphocytes %: 4.4 %  Giant Platelets: Present  Manual Smear Verification: Performed  Red Cell Morphology: Abnormal  Platelet Morphology: Normal  Blasts %: 3.5: Reviewed by pathologist. %  Nucleated RBC: 2 /100 WBCs    #Pancytopenia   -patient stated has was getting routine blood work at PCP office in [place] Dr. Khan and was told to come to ER for abnormal labs  -as per patient has hx of thrombocytopenia but hasn't been an issue besides can bruise easily at time, does not remember hematologist that he saw and stated hasn't followed w/them in 5+ years  -patient denies fever, chill, night sweats or weight loss but does admit that he has had a URI on and off for past month w/ runny nose  -denies any auto-immune disorders  -periopheral smear initially showing 1/10/24 showing 3.5% blast concerning for MDS/ acute leukemia   -smear personally reviewed 1/11/24 w/ Dr. Junior showing Normocytic RBC w/ Anisocytosis, Poikilocytosis, elliptocytes with occasional blast like cells   -given long standing thrombocytopenia dating back to 2018, this could be MDS vs acute leukemia  -discussed importance of getting BM BX for definitive diagnosis to patient and family in length, after discussion they felt they needed some more time to think about it    RECS:  -pending G6PD  -pending periopheral flow cyt -spoke w/ flow lab to be expedited   -pending Fibrinogen   -consider consult IR for BM bX if patient amendable   -maintain hgb >7 or if symptomatic   -Keep plt>10, Febrile in last 24hours >15K, LP>40K, Non-major bleeding/invasive procedures >50K, Major bleeding >80K   -Will follow        Will continue to monitor the patient.  Please call with any questions [phone]  Above reviewed with [place] [address]  [phone]  *Note not finalized until signed by Attending Physician

## 2024-01-11 NOTE — PROGRESS NOTE ADULT - SUBJECTIVE AND OBJECTIVE BOX
Heme/Onc Progress note    INTERVAL HPI/OVERNIGHT EVENTS:  Patient S&E at bedside. No o/n events, patient appears comfortable stated feels about the same     VITAL SIGNS:  T(F): 97.9 (01-11-24 @ 11:25)  HR: 74 (01-11-24 @ 11:25)  BP: 117/66 (01-11-24 @ 11:25)  RR: 18 (01-11-24 @ 11:25)  SpO2: 96% (01-11-24 @ 11:25)  Wt(kg): --    PHYSICAL EXAM:    Constitutional: NAD  Eyes: EOMI, sclera non-icteric  Neck: supple, no masses, no JVD  Respiratory: CTA b/l, good air entry b/l  Cardiovascular: RRR, no M/R/G  Gastrointestinal: soft, NTND, no masses palpable, + BS, no hepatosplenomegaly  Extremities: no c/c/e  Neurological: AAOx3      MEDICATIONS  (STANDING):    MEDICATIONS  (PRN):      Allergies    penicillin (Urticaria)    Intolerances        LABS:                        8.3    0.67  )-----------( 26       ( 11 Jan 2024 07:29 )             26.6     01-11    140  |  106  |  10.2  ----------------------------<  91  4.1   |  25.0  |  0.61    Ca    8.4      11 Jan 2024 07:29  Mg     2.3     01-10    TPro  6.4<L>  /  Alb  4.2  /  TBili  0.7  /  DBili  0.2  /  AST  16  /  ALT  12  /  AlkPhos  107  01-11    PT/INR - ( 10 Saleem 2024 12:00 )   PT: 13.8 sec;   INR: 1.25 ratio         PTT - ( 10 Saleem 2024 12:00 )  PTT:32.6 sec  Urinalysis Basic - ( 11 Jan 2024 07:29 )    Color: x / Appearance: x / SG: x / pH: x  Gluc: 91 mg/dL / Ketone: x  / Bili: x / Urobili: x   Blood: x / Protein: x / Nitrite: x   Leuk Esterase: x / RBC: x / WBC x   Sq Epi: x / Non Sq Epi: x / Bacteria: x    Manual Differential (01.11.24 @ 07:29)   Tear Drops: Slight  Poikilocytosis: Moderate  Polychromasia: Slight  Ovalocytes: Moderate  Hypochromia: Slight  Macrocytosis: Slight  Microcytosis: Slight  Anisocytosis: Slight  Elliptocytes: Moderate  Red Cell Morphology: Abnormal  Platelet Morphology: Normal: PLT: Giant Platelets Present.  Giant Platelets: Present  Manual Smear Verification: Performed WBC: Dohle Bodies Present.  Reactive Lymphocytes %: 8.6 %  Blasts %: 1.7 %    RADIOLOGY & ADDITIONAL TESTS:  Studies reviewed.

## 2024-01-11 NOTE — PROGRESS NOTE ADULT - SUBJECTIVE AND OBJECTIVE BOX
Morales Andino M.D.    Patient is a 74y old  Male who presents with a chief complaint of Abnormal Labs (10 Saleem 2024 16:24)      SUBJECTIVE / OVERNIGHT EVENTS: no concerns.     Patient denies chest pain, SOB, abd pain, N/V, fever, chills, dysuria or any other complaints. All remainder ROS negative.     MEDICATIONS  (STANDING):    MEDICATIONS  (PRN):      I&O's Summary      PHYSICAL EXAM:  Vital Signs Last 24 Hrs  T(C): 37.4 (11 Jan 2024 08:12), Max: 37.4 (11 Jan 2024 08:12)  T(F): 99.3 (11 Jan 2024 08:12), Max: 99.3 (11 Jan 2024 08:12)  HR: 68 (11 Jan 2024 08:12) (66 - 72)  BP: 128/76 (11 Jan 2024 08:12) (120/72 - 139/72)  BP(mean): --  RR: 18 (11 Jan 2024 08:12) (18 - 18)  SpO2: 97% (11 Jan 2024 08:12) (95% - 98%)    Parameters below as of 11 Jan 2024 08:12  Patient On (Oxygen Delivery Method): room air        CONSTITUTIONAL: NAD, well-groomed  ENMT: Moist oral mucosa, no pharyngeal injection or exudates; normal dentition  RESPIRATORY: Normal respiratory effort; lungs are clear to auscultation bilaterally  CARDIOVASCULAR: Regular rate and rhythm, normal S1 and S2, no murmur/rub/gallop; No lower extremity edema; Peripheral pulses are 2+ bilaterally  ABDOMEN: Nontender to palpation, normoactive bowel sounds, no rebound/guarding; No hepatosplenomegaly  MUSCLOSKELETAL:  Normal gait; no clubbing or cyanosis of digits; no joint swelling or tenderness to palpation  PSYCH: A+O x3; affect appropriate  NEUROLOGY: CN 2-12 are intact and symmetric; no gross sensory deficits;   SKIN: No rashes; no palpable lesions    LABS:                        8.3    0.67  )-----------( x        ( 11 Jan 2024 07:29 )             26.6     01-11    140  |  106  |  10.2  ----------------------------<  91  4.1   |  25.0  |  0.61    Ca    8.4      11 Jan 2024 07:29  Mg     2.3     01-10    TPro  6.4<L>  /  Alb  4.2  /  TBili  0.7  /  DBili  0.2  /  AST  16  /  ALT  12  /  AlkPhos  107  01-11    PT/INR - ( 10 Saleem 2024 12:00 )   PT: 13.8 sec;   INR: 1.25 ratio         PTT - ( 10 Saleem 2024 12:00 )  PTT:32.6 sec      Urinalysis Basic - ( 11 Jan 2024 07:29 )    Color: x / Appearance: x / SG: x / pH: x  Gluc: 91 mg/dL / Ketone: x  / Bili: x / Urobili: x   Blood: x / Protein: x / Nitrite: x   Leuk Esterase: x / RBC: x / WBC x   Sq Epi: x / Non Sq Epi: x / Bacteria: x        CAPILLARY BLOOD GLUCOSE          RADIOLOGY & ADDITIONAL TESTS:  Results Reviewed:   Imaging Personally Reviewed:  Electrocardiogram Personally Reviewed:

## 2024-01-12 ENCOUNTER — TRANSCRIPTION ENCOUNTER (OUTPATIENT)
Age: 74
End: 2024-01-12

## 2024-01-12 VITALS
TEMPERATURE: 98 F | OXYGEN SATURATION: 99 % | SYSTOLIC BLOOD PRESSURE: 112 MMHG | RESPIRATION RATE: 18 BRPM | HEART RATE: 76 BPM | DIASTOLIC BLOOD PRESSURE: 63 MMHG

## 2024-01-12 LAB
ANISOCYTOSIS BLD QL: SIGNIFICANT CHANGE UP
ANISOCYTOSIS BLD QL: SIGNIFICANT CHANGE UP
BASOPHILS # BLD AUTO: 0.01 K/UL — SIGNIFICANT CHANGE UP (ref 0–0.2)
BASOPHILS # BLD AUTO: 0.01 K/UL — SIGNIFICANT CHANGE UP (ref 0–0.2)
BASOPHILS NFR BLD AUTO: 0.9 % — SIGNIFICANT CHANGE UP (ref 0–2)
BASOPHILS NFR BLD AUTO: 0.9 % — SIGNIFICANT CHANGE UP (ref 0–2)
BLASTS # FLD: 3.5 % — HIGH (ref 0–0)
BLASTS # FLD: 3.5 % — HIGH (ref 0–0)
ELLIPTOCYTES BLD QL SMEAR: SIGNIFICANT CHANGE UP
ELLIPTOCYTES BLD QL SMEAR: SIGNIFICANT CHANGE UP
EOSINOPHIL # BLD AUTO: 0.01 K/UL — SIGNIFICANT CHANGE UP (ref 0–0.5)
EOSINOPHIL # BLD AUTO: 0.01 K/UL — SIGNIFICANT CHANGE UP (ref 0–0.5)
EOSINOPHIL NFR BLD AUTO: 0.8 % — SIGNIFICANT CHANGE UP (ref 0–6)
EOSINOPHIL NFR BLD AUTO: 0.8 % — SIGNIFICANT CHANGE UP (ref 0–6)
GIANT PLATELETS BLD QL SMEAR: PRESENT — SIGNIFICANT CHANGE UP
GIANT PLATELETS BLD QL SMEAR: PRESENT — SIGNIFICANT CHANGE UP
HCT VFR BLD CALC: 28 % — LOW (ref 39–50)
HCT VFR BLD CALC: 28 % — LOW (ref 39–50)
HGB BLD-MCNC: 9.2 G/DL — LOW (ref 13–17)
HGB BLD-MCNC: 9.2 G/DL — LOW (ref 13–17)
LYMPHOCYTES # BLD AUTO: 0.42 K/UL — LOW (ref 1–3.3)
LYMPHOCYTES # BLD AUTO: 0.42 K/UL — LOW (ref 1–3.3)
LYMPHOCYTES # BLD AUTO: 45.2 % — HIGH (ref 13–44)
LYMPHOCYTES # BLD AUTO: 45.2 % — HIGH (ref 13–44)
MANUAL SMEAR VERIFICATION: SIGNIFICANT CHANGE UP
MANUAL SMEAR VERIFICATION: SIGNIFICANT CHANGE UP
MCHC RBC-ENTMCNC: 25 PG — LOW (ref 27–34)
MCHC RBC-ENTMCNC: 25 PG — LOW (ref 27–34)
MCHC RBC-ENTMCNC: 32.9 GM/DL — SIGNIFICANT CHANGE UP (ref 32–36)
MCHC RBC-ENTMCNC: 32.9 GM/DL — SIGNIFICANT CHANGE UP (ref 32–36)
MCV RBC AUTO: 76.1 FL — LOW (ref 80–100)
MCV RBC AUTO: 76.1 FL — LOW (ref 80–100)
MICROCYTES BLD QL: SLIGHT — SIGNIFICANT CHANGE UP
MICROCYTES BLD QL: SLIGHT — SIGNIFICANT CHANGE UP
MONOCYTES # BLD AUTO: 0.03 K/UL — SIGNIFICANT CHANGE UP (ref 0–0.9)
MONOCYTES # BLD AUTO: 0.03 K/UL — SIGNIFICANT CHANGE UP (ref 0–0.9)
MONOCYTES NFR BLD AUTO: 3.5 % — SIGNIFICANT CHANGE UP (ref 2–14)
MONOCYTES NFR BLD AUTO: 3.5 % — SIGNIFICANT CHANGE UP (ref 2–14)
MYELOCYTES NFR BLD: 0.9 % — HIGH (ref 0–0)
MYELOCYTES NFR BLD: 0.9 % — HIGH (ref 0–0)
NEUTROPHILS # BLD AUTO: 0.42 K/UL — LOW (ref 1.8–7.4)
NEUTROPHILS # BLD AUTO: 0.42 K/UL — LOW (ref 1.8–7.4)
NEUTROPHILS NFR BLD AUTO: 45.2 % — SIGNIFICANT CHANGE UP (ref 43–77)
NEUTROPHILS NFR BLD AUTO: 45.2 % — SIGNIFICANT CHANGE UP (ref 43–77)
NRBC # BLD: 2 /100 WBCS — HIGH (ref 0–0)
NRBC # BLD: 2 /100 WBCS — HIGH (ref 0–0)
OVALOCYTES BLD QL SMEAR: SLIGHT — SIGNIFICANT CHANGE UP
OVALOCYTES BLD QL SMEAR: SLIGHT — SIGNIFICANT CHANGE UP
PLAT MORPH BLD: NORMAL — SIGNIFICANT CHANGE UP
PLAT MORPH BLD: NORMAL — SIGNIFICANT CHANGE UP
PLATELET # BLD AUTO: 29 K/UL — LOW (ref 150–400)
PLATELET # BLD AUTO: 29 K/UL — LOW (ref 150–400)
POIKILOCYTOSIS BLD QL AUTO: SIGNIFICANT CHANGE UP
POIKILOCYTOSIS BLD QL AUTO: SIGNIFICANT CHANGE UP
POLYCHROMASIA BLD QL SMEAR: SLIGHT — SIGNIFICANT CHANGE UP
POLYCHROMASIA BLD QL SMEAR: SLIGHT — SIGNIFICANT CHANGE UP
RBC # BLD: 3.68 M/UL — LOW (ref 4.2–5.8)
RBC # BLD: 3.68 M/UL — LOW (ref 4.2–5.8)
RBC # FLD: 25.3 % — HIGH (ref 10.3–14.5)
RBC # FLD: 25.3 % — HIGH (ref 10.3–14.5)
RBC BLD AUTO: ABNORMAL
RBC BLD AUTO: ABNORMAL
TM INTERPRETATION: SIGNIFICANT CHANGE UP
TM INTERPRETATION: SIGNIFICANT CHANGE UP
WBC # BLD: 0.92 K/UL — CRITICAL LOW (ref 3.8–10.5)
WBC # BLD: 0.92 K/UL — CRITICAL LOW (ref 3.8–10.5)
WBC # FLD AUTO: 0.92 K/UL — CRITICAL LOW (ref 3.8–10.5)
WBC # FLD AUTO: 0.92 K/UL — CRITICAL LOW (ref 3.8–10.5)

## 2024-01-12 PROCEDURE — 36415 COLL VENOUS BLD VENIPUNCTURE: CPT

## 2024-01-12 PROCEDURE — 85730 THROMBOPLASTIN TIME PARTIAL: CPT

## 2024-01-12 PROCEDURE — 80074 ACUTE HEPATITIS PANEL: CPT

## 2024-01-12 PROCEDURE — 99285 EMERGENCY DEPT VISIT HI MDM: CPT

## 2024-01-12 PROCEDURE — 80053 COMPREHEN METABOLIC PANEL: CPT

## 2024-01-12 PROCEDURE — 84466 ASSAY OF TRANSFERRIN: CPT

## 2024-01-12 PROCEDURE — 88264 CHROMOSOME ANALYSIS 20-25: CPT

## 2024-01-12 PROCEDURE — 84484 ASSAY OF TROPONIN QUANT: CPT

## 2024-01-12 PROCEDURE — 85610 PROTHROMBIN TIME: CPT

## 2024-01-12 PROCEDURE — 86901 BLOOD TYPING SEROLOGIC RH(D): CPT

## 2024-01-12 PROCEDURE — 83540 ASSAY OF IRON: CPT

## 2024-01-12 PROCEDURE — 87205 SMEAR GRAM STAIN: CPT

## 2024-01-12 PROCEDURE — 86850 RBC ANTIBODY SCREEN: CPT

## 2024-01-12 PROCEDURE — 83615 LACTATE (LD) (LDH) ENZYME: CPT

## 2024-01-12 PROCEDURE — 88271 CYTOGENETICS DNA PROBE: CPT

## 2024-01-12 PROCEDURE — 86703 HIV-1/HIV-2 1 RESULT ANTBDY: CPT

## 2024-01-12 PROCEDURE — 80048 BASIC METABOLIC PNL TOTAL CA: CPT

## 2024-01-12 PROCEDURE — 88280 CHROMOSOME KARYOTYPE STUDY: CPT

## 2024-01-12 PROCEDURE — 87040 BLOOD CULTURE FOR BACTERIA: CPT

## 2024-01-12 PROCEDURE — 85385 FIBRINOGEN ANTIGEN: CPT

## 2024-01-12 PROCEDURE — 88185 FLOWCYTOMETRY/TC ADD-ON: CPT

## 2024-01-12 PROCEDURE — 82746 ASSAY OF FOLIC ACID SERUM: CPT

## 2024-01-12 PROCEDURE — 88275 CYTOGENETICS 100-300: CPT

## 2024-01-12 PROCEDURE — 93005 ELECTROCARDIOGRAM TRACING: CPT

## 2024-01-12 PROCEDURE — 88184 FLOWCYTOMETRY/ TC 1 MARKER: CPT

## 2024-01-12 PROCEDURE — 82607 VITAMIN B-12: CPT

## 2024-01-12 PROCEDURE — 82955 ASSAY OF G6PD ENZYME: CPT

## 2024-01-12 PROCEDURE — 83605 ASSAY OF LACTIC ACID: CPT

## 2024-01-12 PROCEDURE — T1013: CPT

## 2024-01-12 PROCEDURE — 84443 ASSAY THYROID STIM HORMONE: CPT

## 2024-01-12 PROCEDURE — 84550 ASSAY OF BLOOD/URIC ACID: CPT

## 2024-01-12 PROCEDURE — 99232 SBSQ HOSP IP/OBS MODERATE 35: CPT

## 2024-01-12 PROCEDURE — 86900 BLOOD TYPING SEROLOGIC ABO: CPT

## 2024-01-12 PROCEDURE — 99239 HOSP IP/OBS DSCHRG MGMT >30: CPT

## 2024-01-12 PROCEDURE — 85379 FIBRIN DEGRADATION QUANT: CPT

## 2024-01-12 PROCEDURE — 88237 TISSUE CULTURE BONE MARROW: CPT

## 2024-01-12 PROCEDURE — 71045 X-RAY EXAM CHEST 1 VIEW: CPT

## 2024-01-12 PROCEDURE — 85025 COMPLETE CBC W/AUTO DIFF WBC: CPT

## 2024-01-12 PROCEDURE — 88285 CHROMOSOME COUNT ADDITIONAL: CPT

## 2024-01-12 PROCEDURE — 86803 HEPATITIS C AB TEST: CPT

## 2024-01-12 PROCEDURE — G0378: CPT

## 2024-01-12 PROCEDURE — 83550 IRON BINDING TEST: CPT

## 2024-01-12 PROCEDURE — 80076 HEPATIC FUNCTION PANEL: CPT

## 2024-01-12 PROCEDURE — 82728 ASSAY OF FERRITIN: CPT

## 2024-01-12 PROCEDURE — 83735 ASSAY OF MAGNESIUM: CPT

## 2024-01-12 NOTE — DISCHARGE NOTE NURSING/CASE MANAGEMENT/SOCIAL WORK - PATIENT PORTAL LINK FT
You can access the FollowMyHealth Patient Portal offered by [facility] by registering at the following website: [email or web address]. By joining [facility]’s FollowMyHealth portal, you will also be able to view your health information using other applications (apps) compatible with our system. You can access the FollowMyHealth Patient Portal offered by [facility] by registering at the following website: [email or web address]. By joining [facility]’s FollowMyHealth portal, you will also be able to view your health information using other applications (apps) compatible with our system.

## 2024-01-12 NOTE — DISCHARGE NOTE PROVIDER - NSDCFUADDAPPT_GEN_ALL_CORE_FT
followup with [facility] interventional outpatient bone marrow biopsy on 1/19 at 11am.  followup with [facility] interventional outpatient bone marrow biopsy on 1/19 at 11am.

## 2024-01-12 NOTE — DISCHARGE NOTE PROVIDER - ATTENDING DISCHARGE PHYSICAL EXAMINATION:
VITALS:   T(C): 37 (01-12-24 @ 11:13), Max: 37 (01-12-24 @ 00:16)  HR: 76 (01-12-24 @ 11:13) (75 - 85)  BP: 123/60 (01-12-24 @ 11:13) (108/66 - 136/79)  RR: 18 (01-12-24 @ 11:13) (18 - 18)  SpO2: 99% (01-12-24 @ 11:13) (95% - 99%)    GENERAL: NAD, lying in bed comfortably  HEAD:  Atraumatic, Normocephalic  EYES: EOMI, PERRLA, conjunctiva and sclera clear  ENT: Moist mucous membranes  NECK: Supple, No JVD  CHEST/LUNG: Clear to auscultation bilaterally; No rales, rhonchi, wheezing, or rubs. Unlabored respirations  HEART: Regular rate and rhythm; No murmurs, rubs, or gallops  ABDOMEN: BSx4; Soft, nontender, nondistended  EXTREMITIES:  2+ Peripheral Pulses, brisk capillary refill. No clubbing, cyanosis, or edema  NERVOUS SYSTEM:  A&Ox3, no focal deficits   SKIN: No rashes or lesions  PSYCH: Normal affect, euthymic mood

## 2024-01-12 NOTE — PROGRESS NOTE ADULT - ASSESSMENT
Patient with a past medical history of reported thrombocytopenia (hasnt been f/u in 5+years) sent to ER by pcp for abnormal routine labs. Found to be pancytopenic     [facility] stated lastorvious BW 3/2023- WBC-3.26, hgb-13, plt-44    Total iron-61, [id number], %sat-25, ferritin-288, , folate->20, TSH-NL  Lactate Dehydrogenase, Serum - 206 U/L, Uric Acid: 5.0 mg/dL, D-Dimer Assay, Quantitative: <150:  Rapid HIV-1/2 Antibody (01.10.24 @ 14:17) -Nonreact:  Hepatitis C Virus Interpretation: Nonreact:  Hepatitis B Core IgM Antibody: Nonreact  Hepatitis B Surface Antigen: Nonreact  Hepatitis A IgM Antibody: Nonreact  Manual Differential (01.10.24 @ 12:00)   Tear Drops: Slight  Poikilocytosis: Marked  Polychromasia: Slight  Schistocytes: Slight  Ovalocytes: Moderate  Microcytosis: Moderate  Anisocytosis: Moderate  Elliptocytes: Moderate  Reactive Lymphocytes %: 4.4 %  Giant Platelets: Present  Manual Smear Verification: Performed  Red Cell Morphology: Abnormal  Platelet Morphology: Normal  Blasts %: 3.5: Reviewed by pathologist. %  Nucleated RBC: 2 /100 WBCs    #Pancytopenia   -patient stated has was getting routine blood work at PCP office in [place] Dr. Khan and was told to come to ER for abnormal labs  -as per patient has hx of thrombocytopenia but hasn't been an issue besides can bruise easily at time, does not remember hematologist that he saw and stated hasn't followed w/them in 5+ years  -patient denies fever, chill, night sweats or weight loss but does admit that he has had a URI on and off for past month w/ runny nose  -denies any auto-immune disorders  peripheral smear initially showing 1/10/24 showing 3.5% blast concerning for MDS/ acute leukemia   -smear personally reviewed 1/11/24 w/ Dr. Junior showing Normocytic RBC w/ Anisocytosis, Poikilocytosis, elliptocytes with occasional blast like cells   -given long standing thrombocytopenia dating back to 2018, this could be MDS vs acute leukemia  -discussed importance of getting BM BX for definitive diagnosis to patient and family in length, after discussion they felt they needed some more time to think about it    RECS:  -pending G6PD  -pending peripheral flow cyt -spoke w/ flow lab to be expedited   -pending Fibrinogen   -consider consult IR for BM BX if patient amendable   -maintain hgb >7 or if symptomatic   -Keep plt>10, Febrile in last 24hours >15K, LP>40K, Non-major bleeding/invasive procedures >50K, Major bleeding >80K   -Will follow        Will continue to monitor the patient.  Please call with any questions [phone]  Above reviewed with Attending Dr. Junior     [facility]  [address] [place] [address]  [phone]  *Note not finalized until signed by Attending Physician  Patient with a past medical history of reported thrombocytopenia (hasnt been f/u in 5+years) sent to ER by pcp for abnormal routine labs. Found to be pancytopenic     [facility] stated lastorvious BW 3/2023- WBC-3.26, hgb-13, plt-44    Total iron-61, [id number], %sat-25, ferritin-288, , folate->20, TSH-NL  Lactate Dehydrogenase, Serum - 206 U/L, Uric Acid: 5.0 mg/dL, D-Dimer Assay, Quantitative: <150:  Rapid HIV-1/2 Antibody (01.10.24 @ 14:17) -Nonreact:  Hepatitis C Virus Interpretation: Nonreact:  Hepatitis B Core IgM Antibody: Nonreact  Hepatitis B Surface Antigen: Nonreact  Hepatitis A IgM Antibody: Nonreact  Manual Differential (01.10.24 @ 12:00)   Tear Drops: Slight  Poikilocytosis: Marked  Polychromasia: Slight  Schistocytes: Slight  Ovalocytes: Moderate  Microcytosis: Moderate  Anisocytosis: Moderate  Elliptocytes: Moderate  Reactive Lymphocytes %: 4.4 %  Giant Platelets: Present  Manual Smear Verification: Performed  Red Cell Morphology: Abnormal  Platelet Morphology: Normal  Blasts %: 3.5: Reviewed by pathologist. %  Nucleated RBC: 2 /100 WBCs    #Pancytopenia   -patient stated has was getting routine blood work at PCP office in [place] Dr. Khan and was told to come to ER for abnormal labs  -as per patient has hx of thrombocytopenia but hasn't been an issue besides can bruise easily at time, does not remember hematologist that he saw and stated hasn't followed w/them in 5+ years  -patient denies fever, chill, night sweats or weight loss but does admit that he has had a URI on and off for past month w/ runny nose  -denies any auto-immune disorders  peripheral smear initially showing 1/10/24 showing 3.5% blast concerning for MDS/ acute leukemia   -smear personally reviewed 1/11/24 w/ Dr. Junior showing Normocytic RBC w/ Anisocytosis, Poikilocytosis, elliptocytes with occasional blast like cells   -given long standing thrombocytopenia dating back to 2018, this could be MDS vs acute leukemia  -discussed importance of getting BM BX for definitive diagnosis to patient and family in length, after discussion they felt they needed some more time to think about it    RECS:  -pending G6PD  -pending peripheral flow cyt -spoke w/ flow lab to be expedited   -pending Fibrinogen   -consider consult IR for BM BX if patient amendable   -maintain hgb >7 or if symptomatic   -Keep plt>10, Febrile in last 24hours >15K, LP>40K, Non-major bleeding/invasive procedures >50K, Major bleeding >80K   -Will follow        Will continue to monitor the patient.  Please call with any questions [phone]  Above reviewed with Attending Dr. Junior     [facility]  [address] [place] [address]  [phone]  *Note not finalized until signed by Attending Physician  Patient with a past medical history of reported thrombocytopenia (hasnt been f/u in 5+years) sent to ER by pcp for abnormal routine labs. Found to be pancytopenic     [facility] stated lastorvious BW 3/2023- WBC-3.26, hgb-13, plt-44    Total iron-61, [id number], %sat-25, ferritin-288, , folate->20, TSH-NL  Lactate Dehydrogenase, Serum - 206 U/L, Uric Acid: 5.0 mg/dL, D-Dimer Assay, Quantitative: <150:  Rapid HIV-1/2 Antibody (01.10.24 @ 14:17) -Nonreact:  Hepatitis C Virus Interpretation: Nonreact:  Hepatitis B Core IgM Antibody: Nonreact  Hepatitis B Surface Antigen: Nonreact  Hepatitis A IgM Antibody: Nonreact  Manual Differential (01.10.24 @ 12:00)   Tear Drops: Slight  Poikilocytosis: Marked  Polychromasia: Slight  Schistocytes: Slight  Ovalocytes: Moderate  Microcytosis: Moderate  Anisocytosis: Moderate  Elliptocytes: Moderate  Reactive Lymphocytes %: 4.4 %  Giant Platelets: Present  Manual Smear Verification: Performed  Red Cell Morphology: Abnormal  Platelet Morphology: Normal  Blasts %: 3.5: Reviewed by pathologist. %  Nucleated RBC: 2 /100 WBCs    #Pancytopenia   -patient stated has was getting routine blood work at PCP office in [place] Dr. Khan and was told to come to ER for abnormal labs  -as per patient has hx of thrombocytopenia but hasn't been an issue besides can bruise easily at time, does not remember hematologist that he saw and stated hasn't followed w/them in 5+ years  -patient denies fever, chill, night sweats or weight loss but does admit that he has had a URI on and off for past month w/ runny nose  -denies any auto-immune disorders  peripheral smear initially showing 1/10/24 showing 3.5% blast concerning for MDS/ acute leukemia   -smear personally reviewed 1/11/24 w/ Dr. Junior showing Normocytic RBC w/ Anisocytosis, Poikilocytosis, elliptocytes with occasional blast like cells   -given long standing thrombocytopenia dating back to 2018, this could be MDS vs acute leukemia  -discussed importance of getting BM BX for definitive diagnosis to patient and family in length, pending discussion w. family     RECS:  -pending G6PD  -pending Fibrinogen   -Flow reviewed showing   -consult IR for BM BX if patient amendable   -maintain hgb >7 or if symptomatic   -Keep plt>10, Febrile in last 24hours >15K, LP>40K, Non-major bleeding/invasive procedures >50K, Major bleeding >80K           Will continue to monitor the patient.  Please call with any questions [phone]  Above reviewed with Attending Dr. Junior     [facility] [address] [place] [address]  [phone]  *Note not finalized until signed by Attending Physician  Patient with a past medical history of reported thrombocytopenia (hasnt been f/u in 5+years) sent to ER by pcp for abnormal routine labs. Found to be pancytopenic     [facility] stated lastorvious BW 3/2023- WBC-3.26, hgb-13, plt-44    Total iron-61, [id number], %sat-25, ferritin-288, , folate->20, TSH-NL  Lactate Dehydrogenase, Serum - 206 U/L, Uric Acid: 5.0 mg/dL, D-Dimer Assay, Quantitative: <150:  Rapid HIV-1/2 Antibody (01.10.24 @ 14:17) -Nonreact:  Hepatitis C Virus Interpretation: Nonreact:  Hepatitis B Core IgM Antibody: Nonreact  Hepatitis B Surface Antigen: Nonreact  Hepatitis A IgM Antibody: Nonreact  Manual Differential (01.10.24 @ 12:00)   Tear Drops: Slight  Poikilocytosis: Marked  Polychromasia: Slight  Schistocytes: Slight  Ovalocytes: Moderate  Microcytosis: Moderate  Anisocytosis: Moderate  Elliptocytes: Moderate  Reactive Lymphocytes %: 4.4 %  Giant Platelets: Present  Manual Smear Verification: Performed  Red Cell Morphology: Abnormal  Platelet Morphology: Normal  Blasts %: 3.5: Reviewed by pathologist. %  Nucleated RBC: 2 /100 WBCs    #Pancytopenia   -patient stated has was getting routine blood work at PCP office in [place] Dr. Khan and was told to come to ER for abnormal labs  -as per patient has hx of thrombocytopenia but hasn't been an issue besides can bruise easily at time, does not remember hematologist that he saw and stated hasn't followed w/them in 5+ years  -patient denies fever, chill, night sweats or weight loss but does admit that he has had a URI on and off for past month w/ runny nose  -denies any auto-immune disorders  peripheral smear initially showing 1/10/24 showing 3.5% blast concerning for MDS/ acute leukemia   -smear personally reviewed 1/11/24 w/ Dr. Junior showing Normocytic RBC w/ Anisocytosis, Poikilocytosis, elliptocytes with occasional blast like cells   -given long standing thrombocytopenia dating back to 2018, this could be MDS vs acute leukemia  -discussed importance of getting BM BX for definitive diagnosis to patient and family in length, pending discussion w. family     RECS:  -pending G6PD  -pending Fibrinogen   -Flow reviewed showing   -consult IR for BM BX if patient amendable   -maintain hgb >7 or if symptomatic   -Keep plt>10, Febrile in last 24hours >15K, LP>40K, Non-major bleeding/invasive procedures >50K, Major bleeding >80K           Will continue to monitor the patient.  Please call with any questions [phone]  Above reviewed with Attending Dr. Junior     [facility] [address] [place] [address]  [phone]  *Note not finalized until signed by Attending Physician  Patient with a past medical history of reported thrombocytopenia (hasnt been f/u in 5+years) sent to ER by pcp for abnormal routine labs. Found to be pancytopenic     [facility]p [facility] stated lastorvious BW 3/2023- WBC-3.26, hgb-13, plt-44    Total iron-61, [id number], %sat-25, ferritin-288, , folate->20, TSH-NL  Lactate Dehydrogenase, Serum - 206 U/L, Uric Acid: 5.0 mg/dL, D-Dimer Assay, Quantitative: <150:  Rapid HIV-1/2 Antibody (01.10.24 @ 14:17) -Nonreact:  Hepatitis C Virus Interpretation: Nonreact:  Hepatitis B Core IgM Antibody: Nonreact  Hepatitis B Surface Antigen: Nonreact  Hepatitis A IgM Antibody: Nonreact  Manual Differential (01.10.24 @ 12:00)   Tear Drops: Slight  Poikilocytosis: Marked  Polychromasia: Slight  Schistocytes: Slight  Ovalocytes: Moderate  Microcytosis: Moderate  Anisocytosis: Moderate  Elliptocytes: Moderate  Reactive Lymphocytes %: 4.4 %  Giant Platelets: Present  Manual Smear Verification: Performed  Red Cell Morphology: Abnormal  Platelet Morphology: Normal  Blasts %: 3.5: Reviewed by pathologist. %  Nucleated RBC: 2 /100 WBCs    #Pancytopenia   -patient stated has was getting routine blood work at PCP office in [place] Dr. Khan and was told to come to ER for abnormal labs  -as per patient has hx of thrombocytopenia but hasn't been an issue besides can bruise easily at time, does not remember hematologist that he saw and stated hasn't followed w/them in 5+ years  -patient denies fever, chill, night sweats or weight loss but does admit that he has had a URI on and off for past month w/ runny nose  -denies any auto-immune disorders  peripheral smear initially showing 1/10/24 showing 3.5% blast concerning for MDS/ acute leukemia   -smear personally reviewed 1/11/24 w/ Dr. Junior showing Normocytic RBC w/ Anisocytosis, Poikilocytosis, elliptocytes with occasional blast like cells   -given long standing thrombocytopenia dating back to 2018, this could be MDS vs acute leukemia  -discussed importance of getting BM BX for definitive diagnosis to patient and family in length, pending discussion w. family     RECS:  -pending G6PD  -pending Fibrinogen   -Flow reviewed showing likely MDS  -consult IR for BM BX if patient amendable   -maintain hgb >7 or if symptomatic   -Keep plt>10, Febrile in last 24hours >15K, LP>40K, Non-major bleeding/invasive procedures >50K, Major bleeding >80K           Will continue to monitor the patient.  Please call with any questions [phone]  Above reviewed with Attending Dr. Juniro     [facility] [address] [place] [address]  [phone]  *Note not finalized until signed by Attending Physician  Patient with a past medical history of reported thrombocytopenia (hasnt been f/u in 5+years) sent to ER by pcp for abnormal routine labs. Found to be pancytopenic     [facility]p [facility] stated lastorvious BW 3/2023- WBC-3.26, hgb-13, plt-44    Total iron-61, [id number], %sat-25, ferritin-288, , folate->20, TSH-NL  Lactate Dehydrogenase, Serum - 206 U/L, Uric Acid: 5.0 mg/dL, D-Dimer Assay, Quantitative: <150:  Rapid HIV-1/2 Antibody (01.10.24 @ 14:17) -Nonreact:  Hepatitis C Virus Interpretation: Nonreact:  Hepatitis B Core IgM Antibody: Nonreact  Hepatitis B Surface Antigen: Nonreact  Hepatitis A IgM Antibody: Nonreact  Manual Differential (01.10.24 @ 12:00)   Tear Drops: Slight  Poikilocytosis: Marked  Polychromasia: Slight  Schistocytes: Slight  Ovalocytes: Moderate  Microcytosis: Moderate  Anisocytosis: Moderate  Elliptocytes: Moderate  Reactive Lymphocytes %: 4.4 %  Giant Platelets: Present  Manual Smear Verification: Performed  Red Cell Morphology: Abnormal  Platelet Morphology: Normal  Blasts %: 3.5: Reviewed by pathologist. %  Nucleated RBC: 2 /100 WBCs    #Pancytopenia   -patient stated has was getting routine blood work at PCP office in [place] Dr. Khan and was told to come to ER for abnormal labs  -as per patient has hx of thrombocytopenia but hasn't been an issue besides can bruise easily at time, does not remember hematologist that he saw and stated hasn't followed w/them in 5+ years  -patient denies fever, chill, night sweats or weight loss but does admit that he has had a URI on and off for past month w/ runny nose  -denies any auto-immune disorders  peripheral smear initially showing 1/10/24 showing 3.5% blast concerning for MDS/ acute leukemia   -smear personally reviewed 1/11/24 w/ Dr. Junior showing Normocytic RBC w/ Anisocytosis, Poikilocytosis, elliptocytes with occasional blast like cells   -given long standing thrombocytopenia dating back to 2018, this could be MDS vs acute leukemia  -discussed importance of getting BM BX for definitive diagnosis to patient and family in length, pending discussion w. family     RECS:  -pending G6PD  -pending Fibrinogen   -Flow reviewed showing likely MDS  -consult IR for BM BX if patient amendable   -maintain hgb >7 or if symptomatic   -Keep plt>10, Febrile in last 24hours >15K, LP>40K, Non-major bleeding/invasive procedures >50K, Major bleeding >80K           Will continue to monitor the patient.  Please call with any questions [phone]  Above reviewed with Attending Dr. Junior     [facility] [address] [place] [address]  [phone]  *Note not finalized until signed by Attending Physician  Patient with a past medical history of reported thrombocytopenia (hasnt been f/u in 5+years) sent to ER by pcp for abnormal routine labs. Found to be pancytopenic     [facility] stated lastorvious BW 3/2023- WBC-3.26, hgb-13, plt-44    Total iron-61, [id number], %sat-25, ferritin-288, , folate->20, TSH-NL  Lactate Dehydrogenase, Serum - 206 U/L, Uric Acid: 5.0 mg/dL, D-Dimer Assay, Quantitative: <150:  Rapid HIV-1/2 Antibody (01.10.24 @ 14:17) -Nonreact:  Hepatitis C Virus Interpretation: Nonreact:  Hepatitis B Core IgM Antibody: Nonreact  Hepatitis B Surface Antigen: Nonreact  Hepatitis A IgM Antibody: Nonreact  Manual Differential (01.10.24 @ 12:00)   Tear Drops: Slight  Poikilocytosis: Marked  Polychromasia: Slight  Schistocytes: Slight  Ovalocytes: Moderate  Microcytosis: Moderate  Anisocytosis: Moderate  Elliptocytes: Moderate  Reactive Lymphocytes %: 4.4 %  Giant Platelets: Present  Manual Smear Verification: Performed  Red Cell Morphology: Abnormal  Platelet Morphology: Normal  Blasts %: 3.5: Reviewed by pathologist. %  Nucleated RBC: 2 /100 WBCs    #Pancytopenia   -patient stated has was getting routine blood work at PCP office in [place] Dr. Khan and was told to come to ER for abnormal labs  -as per patient has hx of thrombocytopenia but hasn't been an issue besides can bruise easily at time, does not remember hematologist that he saw and stated hasn't followed w/them in 5+ years  -patient denies fever, chill, night sweats or weight loss but does admit that he has had a URI on and off for past month w/ runny nose  -denies any auto-immune disorders  peripheral smear initially showing 1/10/24 showing 3.5% blast concerning for MDS/ acute leukemia   -smear personally reviewed 1/11/24 w/ Dr. Junior showing Normocytic RBC w/ Anisocytosis, Poikilocytosis, elliptocytes with occasional blast like cells   -given long standing thrombocytopenia dating back to 2018, this could be MDS vs acute leukemia  -discussed importance of getting BM BX for definitive diagnosis to patient and family in length, pending discussion w. family     RECS:  -pending G6PD  -pending Fibrinogen   -Flow reviewed showing likely MDS-will need BM BX for definitive DX  -consult IR for BM BX if patient amendable   -maintain hgb >7 or if symptomatic   -Keep plt>10, Febrile in last 24hours >15K, LP>40K, Non-major bleeding/invasive procedures >50K, Major bleeding >80K           Will continue to monitor the patient.  Please call with any questions [phone]  Above reviewed with Attending Dr. Junior     [facility]  [address] [place] [address]  [phone]  *Note not finalized until signed by Attending Physician  Patient with a past medical history of reported thrombocytopenia (hasnt been f/u in 5+years) sent to ER by pcp for abnormal routine labs. Found to be pancytopenic     [facility]p [facility] stated lastorvious BW 3/2023- WBC-3.26, hgb-13, plt-44    Total iron-61, [id number], %sat-25, ferritin-288, , folate->20, TSH-NL  Lactate Dehydrogenase, Serum - 206 U/L, Uric Acid: 5.0 mg/dL, D-Dimer Assay, Quantitative: <150:  Rapid HIV-1/2 Antibody (01.10.24 @ 14:17) -Nonreact:  Hepatitis C Virus Interpretation: Nonreact:  Hepatitis B Core IgM Antibody: Nonreact  Hepatitis B Surface Antigen: Nonreact  Hepatitis A IgM Antibody: Nonreact  Manual Differential (01.10.24 @ 12:00)   Tear Drops: Slight  Poikilocytosis: Marked  Polychromasia: Slight  Schistocytes: Slight  Ovalocytes: Moderate  Microcytosis: Moderate  Anisocytosis: Moderate  Elliptocytes: Moderate  Reactive Lymphocytes %: 4.4 %  Giant Platelets: Present  Manual Smear Verification: Performed  Red Cell Morphology: Abnormal  Platelet Morphology: Normal  Blasts %: 3.5: Reviewed by pathologist. %  Nucleated RBC: 2 /100 WBCs    #Pancytopenia   -patient stated has was getting routine blood work at PCP office in [place] Dr. Khan and was told to come to ER for abnormal labs  -as per patient has hx of thrombocytopenia but hasn't been an issue besides can bruise easily at time, does not remember hematologist that he saw and stated hasn't followed w/them in 5+ years  -patient denies fever, chill, night sweats or weight loss but does admit that he has had a URI on and off for past month w/ runny nose  -denies any auto-immune disorders  peripheral smear initially showing 1/10/24 showing 3.5% blast concerning for MDS/ acute leukemia   -smear personally reviewed 1/11/24 w/ Dr. Junior showing Normocytic RBC w/ Anisocytosis, Poikilocytosis, elliptocytes with occasional blast like cells   -given long standing thrombocytopenia dating back to 2018, this could be MDS vs acute leukemia  -discussed importance of getting BM BX for definitive diagnosis to patient and family in length, pending discussion w. family     RECS:  -pending G6PD  -pending Fibrinogen   -Flow reviewed showing likely MDS-will need BM BX for definitive DX  -consult IR for BM BX if patient amendable   -maintain hgb >7 or if symptomatic   -Keep plt>10, Febrile in last 24hours >15K, LP>40K, Non-major bleeding/invasive procedures >50K, Major bleeding >80K           Will continue to monitor the patient.  Please call with any questions [phone]  Above reviewed with Attending Dr. Junior     [facility]  [address] [place] [address]  [phone]  *Note not finalized until signed by Attending Physician  Patient with a past medical history of reported thrombocytopenia (hasnt been f/u in 5+years) sent to ER by pcp for abnormal routine labs. Found to be pancytopenic     [facility]p [facility] stated lastorvious BW 3/2023- WBC-3.26, hgb-13, plt-44    Total iron-61, [id number], %sat-25, ferritin-288, , folate->20, TSH-NL  Lactate Dehydrogenase, Serum - 206 U/L, Uric Acid: 5.0 mg/dL, D-Dimer Assay, Quantitative: <150:  Rapid HIV-1/2 Antibody (01.10.24 @ 14:17) -Nonreact:  Hepatitis C Virus Interpretation: Nonreact:  Hepatitis B Core IgM Antibody: Nonreact  Hepatitis B Surface Antigen: Nonreact  Hepatitis A IgM Antibody: Nonreact  Manual Differential (01.10.24 @ 12:00)   Tear Drops: Slight  Poikilocytosis: Marked  Polychromasia: Slight  Schistocytes: Slight  Ovalocytes: Moderate  Microcytosis: Moderate  Anisocytosis: Moderate  Elliptocytes: Moderate  Reactive Lymphocytes %: 4.4 %  Giant Platelets: Present  Manual Smear Verification: Performed  Red Cell Morphology: Abnormal  Platelet Morphology: Normal  Blasts %: 3.5: Reviewed by pathologist. %  Nucleated RBC: 2 /100 WBCs    #Pancytopenia   -patient stated has was getting routine blood work at PCP office in [place] Dr. Khan and was told to come to ER for abnormal labs  -as per patient has hx of thrombocytopenia but hasn't been an issue besides can bruise easily at time, does not remember hematologist that he saw and stated hasn't followed w/them in 5+ years  -patient denies fever, chill, night sweats or weight loss but does admit that he has had a URI on and off for past month w/ runny nose  -denies any auto-immune disorders  peripheral smear initially showing 1/10/24 showing 3.5% blast concerning for MDS/ acute leukemia   -smear personally reviewed 1/11/24 w/ Dr. Junior showing Normocytic RBC w/ Anisocytosis, Poikilocytosis, elliptocytes with occasional blast like cells   -given long standing thrombocytopenia dating back to 2018, this could be MDS vs acute leukemia  -discussed importance of getting BM BX for definitive diagnosis to patient and family in length,  patient wishes to pursue OP if possible     RECS:  -pending G6PD  -pending Fibrinogen   -Flow reviewed showing likely MDS-will need BM BX for definitive DX  -patient refusing IP BM BX- will work to coordinate stat OP BM BX w. heme f/u   -maintain hgb >7 or if symptomatic   -Keep plt>10, Febrile in last 24hours >15K, LP>40K, Non-major bleeding/invasive procedures >50K, Major bleeding >80K   -Patient  can f/u OP with Heme/ONC Dr. Morelos  when deemed stable for d/c- [facility] [address] [place] [address] [phone]           Will continue to monitor the patient.  Please call with any questions [phone]  Above reviewed with Attending Dr. Junior     [facility]  [address] [place] [address] [phone]  *Note not finalized until signed by Attending Physician  Patient with a past medical history of reported thrombocytopenia (hasnt been f/u in 5+years) sent to ER by pcp for abnormal routine labs. Found to be pancytopenic     pcp [facility] stated lastorvious BW 3/2023- WBC-3.26, hgb-13, plt-44    Total iron-61, [id number], %sat-25, ferritin-288, , folate->20, TSH-NL  Lactate Dehydrogenase, Serum - 206 U/L, Uric Acid: 5.0 mg/dL, D-Dimer Assay, Quantitative: <150:  Rapid HIV-1/2 Antibody (01.10.24 @ 14:17) -Nonreact:  Hepatitis C Virus Interpretation: Nonreact:  Hepatitis B Core IgM Antibody: Nonreact  Hepatitis B Surface Antigen: Nonreact  Hepatitis A IgM Antibody: Nonreact  Manual Differential (01.10.24 @ 12:00)   Tear Drops: Slight  Poikilocytosis: Marked  Polychromasia: Slight  Schistocytes: Slight  Ovalocytes: Moderate  Microcytosis: Moderate  Anisocytosis: Moderate  Elliptocytes: Moderate  Reactive Lymphocytes %: 4.4 %  Giant Platelets: Present  Manual Smear Verification: Performed  Red Cell Morphology: Abnormal  Platelet Morphology: Normal  Blasts %: 3.5: Reviewed by pathologist. %  Nucleated RBC: 2 /100 WBCs    #Pancytopenia   -patient stated has was getting routine blood work at PCP office in [place] Dr. Khan and was told to come to ER for abnormal labs  -as per patient has hx of thrombocytopenia but hasn't been an issue besides can bruise easily at time, does not remember hematologist that he saw and stated hasn't followed w/them in 5+ years  -patient denies fever, chill, night sweats or weight loss but does admit that he has had a URI on and off for past month w/ runny nose  -denies any auto-immune disorders  peripheral smear initially showing 1/10/24 showing 3.5% blast concerning for MDS/ acute leukemia   -smear personally reviewed 1/11/24 w/ Dr. Junior showing Normocytic RBC w/ Anisocytosis, Poikilocytosis, elliptocytes with occasional blast like cells   -given long standing thrombocytopenia dating back to 2018, this could be MDS vs acute leukemia  -discussed importance of getting BM BX for definitive diagnosis to patient and family in length,  patient wishes to pursue OP if possible     RECS:  -pending G6PD  -pending Fibrinogen   -Flow reviewed showing likely MDS-will need BM BX for definitive DX  -patient refusing IP BM BX- will work to coordinate stat OP BM BX w. heme f/u   -maintain hgb >7 or if symptomatic   -Keep plt>10, Febrile in last 24hours >15K, LP>40K, Non-major bleeding/invasive procedures >50K, Major bleeding >80K   -Patient  can f/u OP with Heme/ONC Dr. Morelos  when deemed stable for d/c- [facility] [address] [place] [address] [phone]           Will continue to monitor the patient.  Please call with any questions [phone]  Above reviewed with Attending Dr. Junior     [facility]  [address] [place] [address] [phone]  *Note not finalized until signed by Attending Physician  Patient with a past medical history of reported thrombocytopenia (hasnt been f/u in 5+years) sent to ER by pcp for abnormal routine labs. Found to be pancytopenic     pcp [facility] office4 stated lastorvious BW 3/2023- WBC-3.26, hgb-13, plt-44    Total iron-61, [id number], %sat-25, ferritin-288, , folate->20, TSH-NL  Lactate Dehydrogenase, Serum - 206 U/L, Uric Acid: 5.0 mg/dL, D-Dimer Assay, Quantitative: <150:  Rapid HIV-1/2 Antibody (01.10.24 @ 14:17) -Nonreact:  Hepatitis C Virus Interpretation: Nonreact:  Hepatitis B Core IgM Antibody: Nonreact  Hepatitis B Surface Antigen: Nonreact  Hepatitis A IgM Antibody: Nonreact  Manual Differential (01.10.24 @ 12:00)   Tear Drops: Slight  Poikilocytosis: Marked  Polychromasia: Slight  Schistocytes: Slight  Ovalocytes: Moderate  Microcytosis: Moderate  Anisocytosis: Moderate  Elliptocytes: Moderate  Reactive Lymphocytes %: 4.4 %  Giant Platelets: Present  Manual Smear Verification: Performed  Red Cell Morphology: Abnormal  Platelet Morphology: Normal  Blasts %: 3.5: Reviewed by pathologist. %  Nucleated RBC: 2 /100 WBCs    #Pancytopenia   -patient stated has was getting routine blood work at PCP office in [place] Dr. Khan and was told to come to ER for abnormal labs  -as per patient has hx of thrombocytopenia but hasn't been an issue besides can bruise easily at time, does not remember hematologist that he saw and stated hasn't followed w/them in 5+ years  -patient denies fever, chill, night sweats or weight loss but does admit that he has had a URI on and off for past month w/ runny nose  -denies any auto-immune disorders  peripheral smear initially showing 1/10/24 showing 3.5% blast concerning for MDS/ acute leukemia   -smear personally reviewed 1/11/24 w/ Dr. Junior showing Normocytic RBC w/ Anisocytosis, Poikilocytosis, elliptocytes with occasional blast like cells   -given long standing thrombocytopenia dating back to 2018, this could be MDS vs acute leukemia  -discussed importance of getting BM BX for definitive diagnosis to patient and family in length,  patient wishes to pursue OP if possible     RECS:  -pending G6PD  -pending Fibrinogen   -Peripheral flow with 2.48% meyloblasts,  likely MDS-will need BM BX for definitive DX  -patient still undecided about BMBx and wishes to discuss with his family further.   -he has not required any blood transfusion or platelet support. Remains afebrile and asymptomatic.  -can consider outpatient BMBx with IR in next 1-2 weeks, and subsequent follow up with Dr. Jacquelin Ryan 1 week post biopsy to review results at [address] [address] [phone]           Will continue to monitor the patient.  Please call with any questions [phone]  Above reviewed with Attending Dr. Junior     [address] [address] [phone]  *Note not finalized until signed by Attending Physician  Patient with a past medical history of reported thrombocytopenia (hasnt been f/u in 5+years) sent to ER by pcp for abnormal routine labs. Found to be pancytopenic     pcp [facility] office4 stated lastorvious BW 3/2023- WBC-3.26, hgb-13, plt-44    Total iron-61, [id number], %sat-25, ferritin-288, , folate->20, TSH-NL  Lactate Dehydrogenase, Serum - 206 U/L, Uric Acid: 5.0 mg/dL, D-Dimer Assay, Quantitative: <150:  Rapid HIV-1/2 Antibody (01.10.24 @ 14:17) -Nonreact:  Hepatitis C Virus Interpretation: Nonreact:  Hepatitis B Core IgM Antibody: Nonreact  Hepatitis B Surface Antigen: Nonreact  Hepatitis A IgM Antibody: Nonreact  Manual Differential (01.10.24 @ 12:00)   Tear Drops: Slight  Poikilocytosis: Marked  Polychromasia: Slight  Schistocytes: Slight  Ovalocytes: Moderate  Microcytosis: Moderate  Anisocytosis: Moderate  Elliptocytes: Moderate  Reactive Lymphocytes %: 4.4 %  Giant Platelets: Present  Manual Smear Verification: Performed  Red Cell Morphology: Abnormal  Platelet Morphology: Normal  Blasts %: 3.5: Reviewed by pathologist. %  Nucleated RBC: 2 /100 WBCs    #Pancytopenia   -patient stated has was getting routine blood work at PCP office in [place] Dr. Khan and was told to come to ER for abnormal labs  -as per patient has hx of thrombocytopenia but hasn't been an issue besides can bruise easily at time, does not remember hematologist that he saw and stated hasn't followed w/them in 5+ years  -patient denies fever, chill, night sweats or weight loss but does admit that he has had a URI on and off for past month w/ runny nose  -denies any auto-immune disorders  peripheral smear initially showing 1/10/24 showing 3.5% blast concerning for MDS/ acute leukemia   -smear personally reviewed 1/11/24 w/ Dr. Junior showing Normocytic RBC w/ Anisocytosis, Poikilocytosis, elliptocytes with occasional blast like cells   -given long standing thrombocytopenia dating back to 2018, this could be MDS vs acute leukemia  -discussed importance of getting BM BX for definitive diagnosis to patient and family in length,  patient wishes to pursue OP if possible     RECS:  -pending G6PD  -pending Fibrinogen   -Peripheral flow with 2.48% meyloblasts,  likely MDS-will need BM BX for definitive DX  -patient still undecided about BMBx and wishes to discuss with his family further.   -he has not required any blood transfusion or platelet support. Remains afebrile and asymptomatic.  -can consider outpatient BMBx with IR in next 1-2 weeks, and subsequent follow up with Dr. Jacquelin Ryan 1 week post biopsy to review results at [address] [address] [phone]           Will continue to monitor the patient.  Please call with any questions [phone]  Above reviewed with Attending Dr. Junior     [address] [address] [phone]  *Note not finalized until signed by Attending Physician  Patient with a past medical history of reported thrombocytopenia (hasnt been f/u in 5+years) sent to ER by pcp for abnormal routine labs. Found to be pancytopenic     pcp [facility] office4 stated lastorvious BW 3/2023- WBC-3.26, hgb-13, plt-44    Total iron-61, [id number], %sat-25, ferritin-288, , folate->20, TSH-NL  Lactate Dehydrogenase, Serum - 206 U/L, Uric Acid: 5.0 mg/dL, D-Dimer Assay, Quantitative: <150:  Rapid HIV-1/2 Antibody (01.10.24 @ 14:17) -Nonreact:  Hepatitis C Virus Interpretation: Nonreact:  Hepatitis B Core IgM Antibody: Nonreact  Hepatitis B Surface Antigen: Nonreact  Hepatitis A IgM Antibody: Nonreact  Manual Differential (01.10.24 @ 12:00)   Tear Drops: Slight  Poikilocytosis: Marked  Polychromasia: Slight  Schistocytes: Slight  Ovalocytes: Moderate  Microcytosis: Moderate  Anisocytosis: Moderate  Elliptocytes: Moderate  Reactive Lymphocytes %: 4.4 %  Giant Platelets: Present  Manual Smear Verification: Performed  Red Cell Morphology: Abnormal  Platelet Morphology: Normal  Blasts %: 3.5: Reviewed by pathologist. %  Nucleated RBC: 2 /100 WBCs    #Pancytopenia   -patient stated has was getting routine blood work at PCP office in [place] Dr. Khan and was told to come to ER for abnormal labs  -as per patient has hx of thrombocytopenia but hasn't been an issue besides can bruise easily at time, does not remember hematologist that he saw and stated hasn't followed w/them in 5+ years  -patient denies fever, chill, night sweats or weight loss but does admit that he has had a URI on and off for past month w/ runny nose  -denies any auto-immune disorders  peripheral smear initially showing 1/10/24 showing 3.5% blast concerning for MDS/ acute leukemia   -smear personally reviewed 1/11/24 w/ Dr. Junior showing Normocytic RBC w/ Anisocytosis, Poikilocytosis, elliptocytes with occasional blast like cells   -given long standing thrombocytopenia dating back to 2018, this could be MDS vs acute leukemia  -discussed importance of getting BM BX for definitive diagnosis to patient and family in length,  patient wishes to pursue OP if possible     RECS:  -pending G6PD  -pending Fibrinogen   -Peripheral flow with 2.48% meyloblasts,  likely MDS-will need BM BX for definitive DX  -patient still undecided about BMBx and wishes to discuss with his family further.   -he has not required any blood transfusion or platelet support. Remains afebrile and asymptomatic.  -can consider outpatient BMBx with IR in next 1-2 weeks, and subsequent follow up with Dr. Jacquelin Ryan 1 week post biopsy to review results at [facility] [address] [place] [address] [phone]   -patient scheduled for OP BM BX at [facility] OP IR on 1/19/24 at 11am, and has f/u with hematology w/ Dr. Ryan on 1/29/24 at 9am at [facility]          Will continue to monitor the patient.  Please call with any questions [phone]  Above reviewed with Attending Dr. Junior     [facility]  [address], [place] [id number] [phone]  *Note not finalized until signed by Attending Physician  Patient with a past medical history of reported thrombocytopenia (hasnt been f/u in 5+years) sent to ER by pcp for abnormal routine labs. Found to be pancytopenic     pcp [facility] office4 stated lastorvious BW 3/2023- WBC-3.26, hgb-13, plt-44    Total iron-61, [id number], %sat-25, ferritin-288, , folate->20, TSH-NL  Lactate Dehydrogenase, Serum - 206 U/L, Uric Acid: 5.0 mg/dL, D-Dimer Assay, Quantitative: <150:  Rapid HIV-1/2 Antibody (01.10.24 @ 14:17) -Nonreact:  Hepatitis C Virus Interpretation: Nonreact:  Hepatitis B Core IgM Antibody: Nonreact  Hepatitis B Surface Antigen: Nonreact  Hepatitis A IgM Antibody: Nonreact  Manual Differential (01.10.24 @ 12:00)   Tear Drops: Slight  Poikilocytosis: Marked  Polychromasia: Slight  Schistocytes: Slight  Ovalocytes: Moderate  Microcytosis: Moderate  Anisocytosis: Moderate  Elliptocytes: Moderate  Reactive Lymphocytes %: 4.4 %  Giant Platelets: Present  Manual Smear Verification: Performed  Red Cell Morphology: Abnormal  Platelet Morphology: Normal  Blasts %: 3.5: Reviewed by pathologist. %  Nucleated RBC: 2 /100 WBCs    #Pancytopenia   -patient stated has was getting routine blood work at PCP office in [place] Dr. Khan and was told to come to ER for abnormal labs  -as per patient has hx of thrombocytopenia but hasn't been an issue besides can bruise easily at time, does not remember hematologist that he saw and stated hasn't followed w/them in 5+ years  -patient denies fever, chill, night sweats or weight loss but does admit that he has had a URI on and off for past month w/ runny nose  -denies any auto-immune disorders  peripheral smear initially showing 1/10/24 showing 3.5% blast concerning for MDS/ acute leukemia   -smear personally reviewed 1/11/24 w/ Dr. Junior showing Normocytic RBC w/ Anisocytosis, Poikilocytosis, elliptocytes with occasional blast like cells   -given long standing thrombocytopenia dating back to 2018, this could be MDS vs acute leukemia  -discussed importance of getting BM BX for definitive diagnosis to patient and family in length,  patient wishes to pursue OP if possible     RECS:  -pending G6PD  -pending Fibrinogen   -Peripheral flow with 2.48% meyloblasts,  likely MDS-will need BM BX for definitive DX  -patient still undecided about BMBx and wishes to discuss with his family further.   -he has not required any blood transfusion or platelet support. Remains afebrile and asymptomatic.  -can consider outpatient BMBx with IR in next 1-2 weeks, and subsequent follow up with Dr. Jacquelin Ryan 1 week post biopsy to review results at [facility] [address] [place] [address] [phone]   -patient scheduled for OP BM BX at [facility] OP IR on 1/19/24 at 11am, and has f/u with hematology w/ Dr. Ryan on 1/29/24 at 9am at [facility]          Will continue to monitor the patient.  Please call with any questions [phone]  Above reviewed with Attending Dr. Junior     [facility]  [address], [place] [address] [phone]  *Note not finalized until signed by Attending Physician

## 2024-01-12 NOTE — DISCHARGE NOTE PROVIDER - NSDCCPCAREPLAN_GEN_ALL_CORE_FT
PRINCIPAL DISCHARGE DIAGNOSIS  Diagnosis: Pancytopenia  Assessment and Plan of Treatment: Followup with hemetology outpatient for bone marrow biopsy.      SECONDARY DISCHARGE DIAGNOSES  Diagnosis: MDS (myelodysplastic syndrome)  Assessment and Plan of Treatment:      PRINCIPAL DISCHARGE DIAGNOSIS  Diagnosis: Pancytopenia  Assessment and Plan of Treatment: Followup with interventional radiology for bone marrow biopsy on 1/19 at 11am.      SECONDARY DISCHARGE DIAGNOSES  Diagnosis: MDS (myelodysplastic syndrome)  Assessment and Plan of Treatment:

## 2024-01-12 NOTE — DISCHARGE NOTE NURSING/CASE MANAGEMENT/SOCIAL WORK - NSDCPETBCESMAN_GEN_ALL_CORE
Detail Level: Detailed
If you are a smoker, it is important for your health to stop smoking. Please be aware that second hand smoke is also harmful.

## 2024-01-12 NOTE — DISCHARGE NOTE NURSING/CASE MANAGEMENT/SOCIAL WORK - NSDCFUADDAPPT_GEN_ALL_CORE_FT
followup with [facility] interventional outpatient bone marrow biopsy on 1/19 at 11am.  followup with [facility] interventional outpatient bone marrow biopsy on 1/19 at 11am.

## 2024-01-12 NOTE — PROGRESS NOTE ADULT - PROVIDER SPECIALTY LIST ADULT
----- Message from Ford Zaragoza PA-C sent at 6/30/2020  9:11 AM EDT -----  Can we move up his colonoscopy?   Justin Bhatia
Heme/Onc
Hospitalist
Scheduled patient with Dr Ennis Post on 7/7/2020 @ [facility]  Miralax/Dulcolax instructions  Went over [employer]  Patient is coming into the office for his folder  Patient expressed understanding 
Heme/Onc

## 2024-01-12 NOTE — DISCHARGE NOTE PROVIDER - CARE PROVIDERS DIRECT ADDRESSES
,[email or web address] ,[email or web address] ,[email or web address] ,[email or web address]'s [email or web address] at Stone County.Spyder Lynk.[employer],[email or web address]'s [email or web address] Stone [email or web address] ,[email or web address]

## 2024-01-12 NOTE — DISCHARGE NOTE PROVIDER - CARE PROVIDER_API CALL
Gillian Junior  [address] [address]  Phone: [phone]  Fax: [phone]  Follow Up Time:    Gillian Junior  [address] [address]  Phone: [phone]  Fax: [phone]  Follow Up Time:    Gillian Junior  Hematology  [address] [place] [address]  Phone: [phone]  Fax: [phone]  Follow Up Time:     Juliane Cassidy  Interventional Radiology and Diagnostic Radiology  [address] [place] [address]  Phone: [phone]  Fax: [phone]  Scheduled Appointment: 01/19/2024 11:00 AM   Gillian Junior  Hematology  [address] [place] [address]  Phone: [phone]  Fax: [phone]  Follow Up Time:     Juliane Cassidy  Interventional Radiology and Diagnostic Radiology  [address] [place] [address]  Phone: [phone]  Fax: [phone]  Scheduled Appointment: 01/19/2024 11:00 AM   Juliane Cassidy  Interventional Radiology and Diagnostic Radiology  [address] [place] [address]  Phone: [phone]  Fax: [phone]  Scheduled Appointment: 01/19/2024 11:00 AM    Jacquelin Ryan  Hematology  [address] [place] [address]  Phone: [phone]  Fax: [phone]  Scheduled Appointment: 01/29/2024 09:00 AM   Juliane Cassidy  Interventional Radiology and Diagnostic Radiology  [address] [place] [address]  Phone: [phone]  Fax: [phone]  Scheduled Appointment: 01/19/2024 11:00 AM    Jacquelin Ryan  Hematology  [address] [place] [address]  Phone: [phone]  Fax: [phone]  Scheduled Appointment: 01/29/2024 09:00 AM

## 2024-01-12 NOTE — DISCHARGE NOTE NURSING/CASE MANAGEMENT/SOCIAL WORK - NSTRANSFERBELONGINGSDISPO_GEN_A_NUR
Pt stopped smoking 3 days ago and started to have dyspnea. Fever. Covid test over 2 weeks ago. Inhaler not helping.    with patient

## 2024-01-12 NOTE — DISCHARGE NOTE PROVIDER - PROVIDER TOKENS
PROVIDER:[TOKEN:[[id number]]] PROVIDER:[TOKEN:[[id number]]] PROVIDER:[TOKEN:[[id number]]],PROVIDER:[TOKEN:[[id number]],SCHEDULEDAPPT:[01/19/2024],SCHEDULEDAPPTTIME:[11:00 AM]] PROVIDER:[TOKEN:[[id number]]],PROVIDER:[TOKEN:[[id number]],SCHEDULEDAPPT:[01/19/2024],SCHEDULEDAPPTTIME:[11:00 AM]] PROVIDER:[TOKEN:[[id number]],SCHEDULEDAPPT:[01/19/2024],SCHEDULEDAPPTTIME:[11:00 AM]],PROVIDER:[TOKEN:[[id number]],SCHEDULEDAPPT:[01/29/2024],SCHEDULEDAPPTTIME:[09:00 AM]]

## 2024-01-12 NOTE — DISCHARGE NOTE PROVIDER - HOSPITAL COURSE
73 y/o M with no PMH presents for abnormal outpatient labs significant for leukopenia, anemia and thrombocytopenia admitted for possible leukemia. Hematology consulted, HIV negative, Ferritin, TIBC, LDH, Uric Acid, Vitamin B12 normal, Hep Panel negative. Bcx ngtd. Flow Cytometry with concern for MDS. Pt refused inpatient bone marrow biopsy. Will followup with heme/onc outpatient for outpt biopsy and to followup on the pending labs, including G6PD. 75 y/o M with no PMH presents for abnormal outpatient labs significant for leukopenia, anemia and thrombocytopenia admitted for possible leukemia. Hematology consulted, HIV negative, Ferritin, TIBC, LDH, Uric Acid, Vitamin B12 normal, Hep Panel negative. Bcx ngtd. Flow Cytometry with concern for MDS. Pt refused inpatient bone marrow biopsy. Will followup with heme/onc/IR outpatient for outpt biopsy and to followup on the pending labs, including G6PD. 73 y/o M with no PMH presents for abnormal outpatient labs significant for leukopenia, anemia and thrombocytopenia admitted for possible leukemia. Hematology consulted, HIV negative, Ferritin, TIBC, LDH, Uric Acid, Vitamin B12 normal, Hep Panel negative. Bcx ngtd. Flow Cytometry with concern for MDS. Pt refused inpatient bone marrow biopsy. Will followup with heme/onc/IR outpatient for outpt biopsy and to followup on the pending labs, including G6PD.

## 2024-01-12 NOTE — DISCHARGE NOTE PROVIDER - NPI NUMBER (FOR SYSADMIN USE ONLY) :
[[id number]] [[id number]] [[id number]],[[id number]] [[id number]],[[id number]] [[id number]],[[id number]] [[id number]],[[id number]]

## 2024-01-12 NOTE — DISCHARGE NOTE NURSING/CASE MANAGEMENT/SOCIAL WORK - NSDCPEFALRISK_GEN_ALL_CORE
For information on Fall & Injury Prevention, visit: [email or web address]/[email or web address]-[email or web address] OR  [email or web address]://www.[email or web address]/news/fall-prevention-tips-[email or web address]-injury OR  https://www.cdc.gov/steadi/patient.html For information on Fall & Injury Prevention, visit: [email or web address]://www.[facility]/news/fall-prevention-protects-and-maintains-health-and-mobility OR  https://www.[facility]/news/fall-prevention-tips-[email or web address]-injury OR  https://www.cdc.gov/steadi/patient.html

## 2024-01-12 NOTE — PROGRESS NOTE ADULT - SUBJECTIVE AND OBJECTIVE BOX
incomplete     Heme/Onc Progress note    INTERVAL HPI/OVERNIGHT EVENTS:  Patient S&E at bedside. No o/n events, patient resting comfortably. No complaints at this time. Patient denies fever, chills, dizziness, weakness, CP, palpitations, SOB, cough, N/V/D/C, dysuria, changes in bowel movements, LE edema.    VITAL SIGNS:  T(F): 98.3 (01-12-24 @ 07:57)  HR: 75 (01-12-24 @ 07:57)  BP: 118/76 (01-12-24 @ 07:57)  RR: 18 (01-12-24 @ 07:57)  SpO2: 97% (01-12-24 @ 07:57)  Wt(kg): --    PHYSICAL EXAM:    Constitutional: NAD  Eyes: EOMI, sclera non-icteric  Neck: supple, no masses, no JVD  Respiratory: CTA b/l, good air entry b/l  Cardiovascular: RRR, no M/R/G  Gastrointestinal: soft, NTND, no masses palpable, + BS, no hepatosplenomegaly  Extremities: no c/c/e  Neurological: AAOx3      MEDICATIONS  (STANDING):    MEDICATIONS  (PRN):      Allergies    penicillin (Urticaria)    Intolerances        LABS:                        x      0.92  )-----------( x        ( 12 Jan 2024 07:12 )             x        01-11    140  |  106  |  10.2  ----------------------------<  91  4.1   |  25.0  |  0.61    Ca    8.4      11 Jan 2024 07:29  Mg     2.3     01-10    TPro  6.4<L>  /  Alb  4.2  /  TBili  0.7  /  DBili  0.2  /  AST  16  /  ALT  12  /  AlkPhos  107  01-11    PT/INR - ( 10 Saleem 2024 12:00 )   PT: 13.8 sec;   INR: 1.25 ratio         PTT - ( 10 Saleem 2024 12:00 )  PTT:32.6 sec  Urinalysis Basic - ( 11 Jan 2024 07:29 )    Color: x / Appearance: x / SG: x / pH: x  Gluc: 91 mg/dL / Ketone: x  / Bili: x / Urobili: x   Blood: x / Protein: x / Nitrite: x   Leuk Esterase: x / RBC: x / WBC x   Sq Epi: x / Non Sq Epi: x / Bacteria: x        RADIOLOGY & ADDITIONAL TESTS:  Studies reviewed.         Heme/Onc Progress note    INTERVAL HPI/OVERNIGHT EVENTS:  Patient S&E at bedside. No o/n events, patient resting comfortably. No complaints at this time. Patient denies fever, chills, dizziness, weakness, CP, palpitations, SOB, cough, N/V/D/C, dysuria, changes in bowel movements, LE edema.    VITAL SIGNS:  T(F): 98.3 (01-12-24 @ 07:57)  HR: 75 (01-12-24 @ 07:57)  BP: 118/76 (01-12-24 @ 07:57)  RR: 18 (01-12-24 @ 07:57)  SpO2: 97% (01-12-24 @ 07:57)  Wt(kg): --    PHYSICAL EXAM:    Constitutional: NAD  Eyes: EOMI, sclera non-icteric  Neck: supple, no masses, no JVD  Respiratory: CTA b/l, good air entry b/l  Cardiovascular: RRR, no M/R/G  Gastrointestinal: soft, NTND, no masses palpable, + BS, no hepatosplenomegaly  Extremities: no c/c/e  Neurological: AAOx3      MEDICATIONS  (STANDING):    MEDICATIONS  (PRN):      Allergies    penicillin (Urticaria)    Intolerances        LABS:                        x      0.92  )-----------( x        ( 12 Jan 2024 07:12 )             x        01-11    140  |  106  |  10.2  ----------------------------<  91  4.1   |  25.0  |  0.61    Ca    8.4      11 Jan 2024 07:29  Mg     2.3     01-10    TPro  6.4<L>  /  Alb  4.2  /  TBili  0.7  /  DBili  0.2  /  AST  16  /  ALT  12  /  AlkPhos  107  01-11    PT/INR - ( 10 Saleem 2024 12:00 )   PT: 13.8 sec;   INR: 1.25 ratio         PTT - ( 10 Saleem 2024 12:00 )  PTT:32.6 sec  Urinalysis Basic - ( 11 Jan 2024 07:29 )    Color: x / Appearance: x / SG: x / pH: x  Gluc: 91 mg/dL / Ketone: x  / Bili: x / Urobili: x   Blood: x / Protein: x / Nitrite: x   Leuk Esterase: x / RBC: x / WBC x   Sq Epi: x / Non Sq Epi: x / Bacteria: x        RADIOLOGY & ADDITIONAL TESTS:  Studies reviewed.    Flow Cytometry . (01.10.24 @ 14:17)   TM Interpretation:   Flow Cytometry Final Report   ________________________________________________________________________   Specimen: Peripheral Blood   Date Collected: 01/10/2024   Date Received: 01/11/2024   Date Processed: 01/11/2024   Date Reported: 01/12/2024 9:36   Accession #: [id number]   ________________________________________________________________________   CLINICAL DATA: Rule out leukemia   ________________________________________________________________________   CD45/Side Scatter Differential   Granulocytes: 58 % ; Lymphocytes: 29 % ; Monocytes: 3 % ; Dim CD45 and/or blast gate: 5 %   ; Erythroid/debris: 4 %   ________________________________________________________________________   DIAGNOSIS:   Peripheral Blood:   - Myeloblasts (2.48% of cells), positive for HLA-DR, CD38, CD34, , CD33, CD13, dim CD7;   negative for CD15, CD4, CD64, CD11b, CD56, CD2.   - The myeloid immunophenotypic findings show normal myeloid granularity and normal myeloid   antigen maturation pattern.   - The lymphocyte immunophenotypic findings show no diagnostic abnormalities.   Please see interpretation.   INTERPRETATION:   IMMUNOPHENOTYPE: Myeloblasts (2.48% of cells), positive for HLA-DR, CD38, CD34, , CD33, CD13,   dim CD7; negative for CD15, CD4, CD64, CD11b, CD56, CD2.   CD45/side scatter shows 2.48% myeloblast population. There is no increase in CD14/CD64 positive   cells. Myeloid antigen pattern is normal with CD13, CD16, CD11b, CD15, and CD33.   The myeloid immunophenotypic findings show normal myeloid granularity and normal myeloid antigen   maturation pattern.   Lymphocytes (29% of cells): Heterogeneous population of T-cells (with CD4 to CD8 ratio of   approximately 1, including CD57+ natural killer-like T-cells with multiple subsets), natural killer   cells, and polytypic B-cells.   The lymphocyte immunophenotypic findings show no diagnostic abnormalities.   _____________________________________________________________________   Viability ................. 100 %   The following markers were assessed:   CD2,CD3,CD5,CD7,CD4,CD8,CD16,CD56,CD57,CD10,CD19,CD20,CD22,CD23,CD14,CD64,HLA-DR,CD34,,CD11b,   CD13,CD15,CD33,CD38,,CD58,CRLF2,,Kappa,Lambda,CD9,MPO,TdT,cyto CD3,cyto CD22,cyto   CD79a,CD45.   Verified By: Edappallath , Susmitha M.D MD      Heme/Onc Progress note    INTERVAL HPI/OVERNIGHT EVENTS:  Patient S&E at bedside. No o/n events, patient resting comfortably. No complaints at this time. Patient denies fever, chills, dizziness, weakness, CP, palpitations, SOB, cough, N/V/D/C, dysuria, changes in bowel movements, LE edema.    VITAL SIGNS:  T(F): 98.3 (01-12-24 @ 07:57)  HR: 75 (01-12-24 @ 07:57)  BP: 118/76 (01-12-24 @ 07:57)  RR: 18 (01-12-24 @ 07:57)  SpO2: 97% (01-12-24 @ 07:57)  Wt(kg): --    PHYSICAL EXAM:    Constitutional: NAD  Eyes: EOMI, sclera non-icteric  Neck: supple, no masses, no JVD  Respiratory: CTA b/l, good air entry b/l  Cardiovascular: RRR, no M/R/G  Gastrointestinal: soft, NTND, no masses palpable, + BS, no hepatosplenomegaly  Extremities: no c/c/e  Neurological: AAOx3      MEDICATIONS  (STANDING):    MEDICATIONS  (PRN):      Allergies    penicillin (Urticaria)    Intolerances        LABS:                        x      0.92  )-----------( x        ( 12 Jan 2024 07:12 )             x        01-11    140  |  106  |  10.2  ----------------------------<  91  4.1   |  25.0  |  0.61    Ca    8.4      11 Jan 2024 07:29  Mg     2.3     01-10    TPro  6.4<L>  /  Alb  4.2  /  TBili  0.7  /  DBili  0.2  /  AST  16  /  ALT  12  /  AlkPhos  107  01-11    PT/INR - ( 10 Saleem 2024 12:00 )   PT: 13.8 sec;   INR: 1.25 ratio         PTT - ( 10 Saleem 2024 12:00 )  PTT:32.6 sec  Urinalysis Basic - ( 11 Jan 2024 07:29 )    Color: x / Appearance: x / SG: x / pH: x  Gluc: 91 mg/dL / Ketone: x  / Bili: x / Urobili: x   Blood: x / Protein: x / Nitrite: x   Leuk Esterase: x / RBC: x / WBC x   Sq Epi: x / Non Sq Epi: x / Bacteria: x        RADIOLOGY & ADDITIONAL TESTS:  Studies reviewed.    Flow Cytometry . (01.10.24 @ 14:17)   TM Interpretation:   Flow Cytometry Final Report   ________________________________________________________________________   Specimen: Peripheral Blood   Date Collected: 01/10/2024   Date Received: 01/11/2024   Date Processed: 01/11/2024   Date Reported: 01/12/2024 9:36   Accession #: [id number]   ________________________________________________________________________   CLINICAL DATA: Rule out leukemia   ________________________________________________________________________   CD45/Side Scatter Differential   Granulocytes: 58 % ; Lymphocytes: 29 % ; Monocytes: 3 % ; Dim CD45 and/or blast gate: 5 %   ; Erythroid/debris: 4 %   ________________________________________________________________________   DIAGNOSIS:   Peripheral Blood:   - Myeloblasts (2.48% of cells), positive for HLA-DR, CD38, CD34, , CD33, CD13, dim CD7;   negative for CD15, CD4, CD64, CD11b, CD56, CD2.   - The myeloid immunophenotypic findings show normal myeloid granularity and normal myeloid   antigen maturation pattern.   - The lymphocyte immunophenotypic findings show no diagnostic abnormalities.   Please see interpretation.   INTERPRETATION:   IMMUNOPHENOTYPE: Myeloblasts (2.48% of cells), positive for HLA-DR, CD38, CD34, , CD33, CD13,   dim CD7; negative for CD15, CD4, CD64, CD11b, CD56, CD2.   CD45/side scatter shows 2.48% myeloblast population. There is no increase in CD14/CD64 positive   cells. Myeloid antigen pattern is normal with CD13, CD16, CD11b, CD15, and CD33.   The myeloid immunophenotypic findings show normal myeloid granularity and normal myeloid antigen   maturation pattern.   Lymphocytes (29% of cells): Heterogeneous population of T-cells (with CD4 to CD8 ratio of   approximately 1, including CD57+ natural killer-like T-cells with multiple subsets), natural killer   cells, and polytypic B-cells.   The lymphocyte immunophenotypic findings show no diagnostic abnormalities.   _____________________________________________________________________   Viability ................. 100 %   The following markers were assessed:   CD2,CD3,CD5,CD7,CD4,CD8,CD16,CD56,CD57,CD10,CD19,CD20,CD22,CD23,CD14,CD64,HLA-DR,CD34,,CD11b,   CD13,CD15,CD33,CD38,,CD58,CRLF2,,Kappa,Lambda,CD9,MPO,TdT,cyto CD3,cyto CD22,cyto   CD79a,CD45.   Verified By: Edappallath , Susmitha M.D MD

## 2024-01-13 LAB
FIBRINOGEN AG PPP IA-MCNC: 275 MG/DL — SIGNIFICANT CHANGE UP (ref 233–496)
FIBRINOGEN AG PPP IA-MCNC: 275 MG/DL — SIGNIFICANT CHANGE UP (ref 233–496)

## 2024-01-15 LAB
CULTURE RESULTS: SIGNIFICANT CHANGE UP
CULTURE RESULTS: SIGNIFICANT CHANGE UP
G6PD RBC-CCNC: 24.1 U/G HGB — HIGH (ref 7–20.5)
G6PD RBC-CCNC: 24.1 U/G HGB — HIGH (ref 7–20.5)
SPECIMEN SOURCE: SIGNIFICANT CHANGE UP
SPECIMEN SOURCE: SIGNIFICANT CHANGE UP

## 2024-01-17 ENCOUNTER — APPOINTMENT (OUTPATIENT)
Dept: HEMATOLOGY ONCOLOGY | Facility: CLINIC | Age: 74
End: 2024-01-17

## 2024-01-18 LAB — CHROM ANALY INTERPHASE BLD FISH-IMP: SIGNIFICANT CHANGE UP

## 2024-01-23 LAB — CHROM ANALY OVERALL INTERP SPEC-IMP: SIGNIFICANT CHANGE UP

## 2024-01-24 ENCOUNTER — OUTPATIENT (OUTPATIENT)
Dept: OUTPATIENT SERVICES | Facility: HOSPITAL | Age: 74
LOS: 1 days | Discharge: HOME | End: 2024-01-24

## 2024-01-24 DIAGNOSIS — D61.818 OTHER PANCYTOPENIA: ICD-10-CM

## 2024-01-28 DIAGNOSIS — D61.818 OTHER PANCYTOPENIA: ICD-10-CM

## 2024-01-29 ENCOUNTER — APPOINTMENT (OUTPATIENT)
Dept: HEMATOLOGY ONCOLOGY | Facility: CLINIC | Age: 74
End: 2024-01-29

## 2025-06-26 ENCOUNTER — APPOINTMENT (OUTPATIENT)
Dept: ORTHOPEDIC SURGERY | Facility: CLINIC | Age: 75
End: 2025-06-26